# Patient Record
Sex: MALE | Race: WHITE | NOT HISPANIC OR LATINO | Employment: OTHER | URBAN - METROPOLITAN AREA
[De-identification: names, ages, dates, MRNs, and addresses within clinical notes are randomized per-mention and may not be internally consistent; named-entity substitution may affect disease eponyms.]

---

## 2023-02-24 LAB
ALANINE AMINOTRANSFERASE (SGPT) (U/L) IN SER/PLAS: 22 U/L (ref 10–52)
ALBUMIN (G/DL) IN SER/PLAS: 4.3 G/DL (ref 3.4–5)
ALKALINE PHOSPHATASE (U/L) IN SER/PLAS: 84 U/L (ref 33–136)
ANION GAP IN SER/PLAS: 13 MMOL/L (ref 10–20)
APPEARANCE, URINE: CLEAR
ASPARTATE AMINOTRANSFERASE (SGOT) (U/L) IN SER/PLAS: 21 U/L (ref 9–39)
BILIRUBIN TOTAL (MG/DL) IN SER/PLAS: 0.6 MG/DL (ref 0–1.2)
BILIRUBIN, URINE: NEGATIVE
BLOOD, URINE: ABNORMAL
CALCIUM (MG/DL) IN SER/PLAS: 9.4 MG/DL (ref 8.6–10.3)
CARBON DIOXIDE, TOTAL (MMOL/L) IN SER/PLAS: 30 MMOL/L (ref 21–32)
CHLORIDE (MMOL/L) IN SER/PLAS: 101 MMOL/L (ref 98–107)
COLOR, URINE: YELLOW
CREATININE (MG/DL) IN SER/PLAS: 1 MG/DL (ref 0.5–1.3)
ESTIMATED AVERAGE GLUCOSE FOR HBA1C: 143 MG/DL
GFR MALE: 77 ML/MIN/1.73M2
GLUCOSE (MG/DL) IN SER/PLAS: 115 MG/DL (ref 74–99)
GLUCOSE, URINE: NEGATIVE MG/DL
HEMOGLOBIN A1C/HEMOGLOBIN TOTAL IN BLOOD: 6.6 %
KETONES, URINE: NEGATIVE MG/DL
LEUKOCYTE ESTERASE, URINE: NEGATIVE
MUCUS, URINE: NORMAL /LPF
NITRITE, URINE: NEGATIVE
PH, URINE: 5 (ref 5–8)
POTASSIUM (MMOL/L) IN SER/PLAS: 4.7 MMOL/L (ref 3.5–5.3)
PROTEIN TOTAL: 6.5 G/DL (ref 6.4–8.2)
PROTEIN, URINE: NEGATIVE MG/DL
RBC, URINE: <1 /HPF (ref 0–5)
SODIUM (MMOL/L) IN SER/PLAS: 139 MMOL/L (ref 136–145)
SPECIFIC GRAVITY, URINE: 1.02 (ref 1–1.03)
THYROTROPIN (MIU/L) IN SER/PLAS BY DETECTION LIMIT <= 0.05 MIU/L: 3.99 MIU/L (ref 0.44–3.98)
UREA NITROGEN (MG/DL) IN SER/PLAS: 24 MG/DL (ref 6–23)
UROBILINOGEN, URINE: <2 MG/DL (ref 0–1.9)
WBC, URINE: <1 /HPF (ref 0–5)

## 2023-03-17 LAB
BASOPHILS (10*3/UL) IN BLOOD BY AUTOMATED COUNT: 0.05 X10E9/L (ref 0–0.1)
BASOPHILS/100 LEUKOCYTES IN BLOOD BY AUTOMATED COUNT: 0.7 % (ref 0–2)
CALCIDIOL (25 OH VITAMIN D3) (NG/ML) IN SER/PLAS: 32 NG/ML
COBALAMIN (VITAMIN B12) (PG/ML) IN SER/PLAS: 327 PG/ML (ref 211–911)
EOSINOPHILS (10*3/UL) IN BLOOD BY AUTOMATED COUNT: 0.16 X10E9/L (ref 0–0.4)
EOSINOPHILS/100 LEUKOCYTES IN BLOOD BY AUTOMATED COUNT: 2.1 % (ref 0–6)
ERYTHROCYTE DISTRIBUTION WIDTH (RATIO) BY AUTOMATED COUNT: 14 % (ref 11.5–14.5)
ERYTHROCYTE MEAN CORPUSCULAR HEMOGLOBIN CONCENTRATION (G/DL) BY AUTOMATED: 31.4 G/DL (ref 32–36)
ERYTHROCYTE MEAN CORPUSCULAR VOLUME (FL) BY AUTOMATED COUNT: 95 FL (ref 80–100)
ERYTHROCYTES (10*6/UL) IN BLOOD BY AUTOMATED COUNT: 4.3 X10E12/L (ref 4.5–5.9)
HEMATOCRIT (%) IN BLOOD BY AUTOMATED COUNT: 40.7 % (ref 41–52)
HEMOGLOBIN (G/DL) IN BLOOD: 12.8 G/DL (ref 13.5–17.5)
IMMATURE GRANULOCYTES/100 LEUKOCYTES IN BLOOD BY AUTOMATED COUNT: 0.3 % (ref 0–0.9)
IRON (UG/DL) IN SER/PLAS: 78 UG/DL (ref 35–150)
IRON BINDING CAPACITY (UG/DL) IN SER/PLAS: 386 UG/DL (ref 240–445)
IRON SATURATION (%) IN SER/PLAS: 20 % (ref 25–45)
LEUKOCYTES (10*3/UL) IN BLOOD BY AUTOMATED COUNT: 7.6 X10E9/L (ref 4.4–11.3)
LYMPHOCYTES (10*3/UL) IN BLOOD BY AUTOMATED COUNT: 1.6 X10E9/L (ref 0.8–3)
LYMPHOCYTES/100 LEUKOCYTES IN BLOOD BY AUTOMATED COUNT: 21.2 % (ref 13–44)
MONOCYTES (10*3/UL) IN BLOOD BY AUTOMATED COUNT: 0.64 X10E9/L (ref 0.05–0.8)
MONOCYTES/100 LEUKOCYTES IN BLOOD BY AUTOMATED COUNT: 8.5 % (ref 2–10)
NEUTROPHILS (10*3/UL) IN BLOOD BY AUTOMATED COUNT: 5.09 X10E9/L (ref 1.6–5.5)
NEUTROPHILS/100 LEUKOCYTES IN BLOOD BY AUTOMATED COUNT: 67.2 % (ref 40–80)
PLATELETS (10*3/UL) IN BLOOD AUTOMATED COUNT: 323 X10E9/L (ref 150–450)

## 2023-06-21 LAB
BASOPHILS (10*3/UL) IN BLOOD BY AUTOMATED COUNT: 0.06 X10E9/L (ref 0–0.1)
BASOPHILS/100 LEUKOCYTES IN BLOOD BY AUTOMATED COUNT: 0.8 % (ref 0–2)
EOSINOPHILS (10*3/UL) IN BLOOD BY AUTOMATED COUNT: 0.13 X10E9/L (ref 0–0.4)
EOSINOPHILS/100 LEUKOCYTES IN BLOOD BY AUTOMATED COUNT: 1.7 % (ref 0–6)
ERYTHROCYTE DISTRIBUTION WIDTH (RATIO) BY AUTOMATED COUNT: 13.7 % (ref 11.5–14.5)
ERYTHROCYTE MEAN CORPUSCULAR HEMOGLOBIN CONCENTRATION (G/DL) BY AUTOMATED: 31.4 G/DL (ref 32–36)
ERYTHROCYTE MEAN CORPUSCULAR VOLUME (FL) BY AUTOMATED COUNT: 94 FL (ref 80–100)
ERYTHROCYTES (10*6/UL) IN BLOOD BY AUTOMATED COUNT: 3.95 X10E12/L (ref 4.5–5.9)
HEMATOCRIT (%) IN BLOOD BY AUTOMATED COUNT: 37.3 % (ref 41–52)
HEMOGLOBIN (G/DL) IN BLOOD: 11.7 G/DL (ref 13.5–17.5)
IMMATURE GRANULOCYTES/100 LEUKOCYTES IN BLOOD BY AUTOMATED COUNT: 0.4 % (ref 0–0.9)
IRON (UG/DL) IN SER/PLAS: 55 UG/DL (ref 35–150)
IRON BINDING CAPACITY (UG/DL) IN SER/PLAS: 308 UG/DL (ref 240–445)
IRON SATURATION (%) IN SER/PLAS: 18 % (ref 25–45)
LEUKOCYTES (10*3/UL) IN BLOOD BY AUTOMATED COUNT: 7.5 X10E9/L (ref 4.4–11.3)
LYMPHOCYTES (10*3/UL) IN BLOOD BY AUTOMATED COUNT: 1.41 X10E9/L (ref 0.8–3)
LYMPHOCYTES/100 LEUKOCYTES IN BLOOD BY AUTOMATED COUNT: 18.8 % (ref 13–44)
MONOCYTES (10*3/UL) IN BLOOD BY AUTOMATED COUNT: 0.56 X10E9/L (ref 0.05–0.8)
MONOCYTES/100 LEUKOCYTES IN BLOOD BY AUTOMATED COUNT: 7.4 % (ref 2–10)
NEUTROPHILS (10*3/UL) IN BLOOD BY AUTOMATED COUNT: 5.33 X10E9/L (ref 1.6–5.5)
NEUTROPHILS/100 LEUKOCYTES IN BLOOD BY AUTOMATED COUNT: 70.9 % (ref 40–80)
PLATELETS (10*3/UL) IN BLOOD AUTOMATED COUNT: 325 X10E9/L (ref 150–450)

## 2023-07-08 LAB — COBALAMIN (VITAMIN B12) (PG/ML) IN SER/PLAS: 294 PG/ML (ref 211–911)

## 2023-09-05 LAB
ALANINE AMINOTRANSFERASE (SGPT) (U/L) IN SER/PLAS: 19 U/L (ref 10–52)
ALBUMIN (G/DL) IN SER/PLAS: 4.4 G/DL (ref 3.4–5)
ALKALINE PHOSPHATASE (U/L) IN SER/PLAS: 84 U/L (ref 33–136)
ANION GAP IN SER/PLAS: 11 MMOL/L (ref 10–20)
APPEARANCE, URINE: NORMAL
ASPARTATE AMINOTRANSFERASE (SGOT) (U/L) IN SER/PLAS: 21 U/L (ref 9–39)
BASOPHILS (10*3/UL) IN BLOOD BY AUTOMATED COUNT: 0.08 X10E9/L (ref 0–0.1)
BASOPHILS/100 LEUKOCYTES IN BLOOD BY AUTOMATED COUNT: 0.9 % (ref 0–2)
BILIRUBIN TOTAL (MG/DL) IN SER/PLAS: 0.6 MG/DL (ref 0–1.2)
BILIRUBIN, URINE: NEGATIVE
BLOOD, URINE: NEGATIVE
CALCIUM (MG/DL) IN SER/PLAS: 9.6 MG/DL (ref 8.6–10.3)
CARBON DIOXIDE, TOTAL (MMOL/L) IN SER/PLAS: 27 MMOL/L (ref 21–32)
CHLORIDE (MMOL/L) IN SER/PLAS: 104 MMOL/L (ref 98–107)
CHOLESTEROL (MG/DL) IN SER/PLAS: 172 MG/DL (ref 0–199)
CHOLESTEROL IN HDL (MG/DL) IN SER/PLAS: 46.2 MG/DL
CHOLESTEROL/HDL RATIO: 3.7
COLOR, URINE: YELLOW
CREATININE (MG/DL) IN SER/PLAS: 0.91 MG/DL (ref 0.5–1.3)
EOSINOPHILS (10*3/UL) IN BLOOD BY AUTOMATED COUNT: 0.24 X10E9/L (ref 0–0.4)
EOSINOPHILS/100 LEUKOCYTES IN BLOOD BY AUTOMATED COUNT: 2.7 % (ref 0–6)
ERYTHROCYTE DISTRIBUTION WIDTH (RATIO) BY AUTOMATED COUNT: 14.6 % (ref 11.5–14.5)
ERYTHROCYTE MEAN CORPUSCULAR HEMOGLOBIN CONCENTRATION (G/DL) BY AUTOMATED: 31.9 G/DL (ref 32–36)
ERYTHROCYTE MEAN CORPUSCULAR VOLUME (FL) BY AUTOMATED COUNT: 95 FL (ref 80–100)
ERYTHROCYTES (10*6/UL) IN BLOOD BY AUTOMATED COUNT: 4.07 X10E12/L (ref 4.5–5.9)
GFR MALE: 85 ML/MIN/1.73M2
GLUCOSE (MG/DL) IN SER/PLAS: 92 MG/DL (ref 74–99)
GLUCOSE, URINE: NEGATIVE MG/DL
HEMATOCRIT (%) IN BLOOD BY AUTOMATED COUNT: 38.6 % (ref 41–52)
HEMOGLOBIN (G/DL) IN BLOOD: 12.3 G/DL (ref 13.5–17.5)
IMMATURE GRANULOCYTES/100 LEUKOCYTES IN BLOOD BY AUTOMATED COUNT: 0.2 % (ref 0–0.9)
KETONES, URINE: NEGATIVE MG/DL
LDL: 93 MG/DL (ref 0–99)
LEUKOCYTE ESTERASE, URINE: NEGATIVE
LEUKOCYTES (10*3/UL) IN BLOOD BY AUTOMATED COUNT: 8.7 X10E9/L (ref 4.4–11.3)
LYMPHOCYTES (10*3/UL) IN BLOOD BY AUTOMATED COUNT: 2.18 X10E9/L (ref 0.8–3)
LYMPHOCYTES/100 LEUKOCYTES IN BLOOD BY AUTOMATED COUNT: 25 % (ref 13–44)
MONOCYTES (10*3/UL) IN BLOOD BY AUTOMATED COUNT: 0.71 X10E9/L (ref 0.05–0.8)
MONOCYTES/100 LEUKOCYTES IN BLOOD BY AUTOMATED COUNT: 8.1 % (ref 2–10)
MUCUS, URINE: NORMAL /LPF
NEUTROPHILS (10*3/UL) IN BLOOD BY AUTOMATED COUNT: 5.5 X10E9/L (ref 1.6–5.5)
NEUTROPHILS/100 LEUKOCYTES IN BLOOD BY AUTOMATED COUNT: 63.1 % (ref 40–80)
NITRITE, URINE: NEGATIVE
PH, URINE: 5 (ref 5–8)
PLATELETS (10*3/UL) IN BLOOD AUTOMATED COUNT: 289 X10E9/L (ref 150–450)
POTASSIUM (MMOL/L) IN SER/PLAS: 4.4 MMOL/L (ref 3.5–5.3)
PROTEIN TOTAL: 6.6 G/DL (ref 6.4–8.2)
PROTEIN, URINE: NEGATIVE MG/DL
RBC, URINE: 1 /HPF (ref 0–5)
SODIUM (MMOL/L) IN SER/PLAS: 138 MMOL/L (ref 136–145)
SPECIFIC GRAVITY, URINE: 1.02 (ref 1–1.03)
SQUAMOUS EPITHELIAL CELLS, URINE: <1 /HPF
THYROTROPIN (MIU/L) IN SER/PLAS BY DETECTION LIMIT <= 0.05 MIU/L: 3.84 MIU/L (ref 0.44–3.98)
TRIGLYCERIDE (MG/DL) IN SER/PLAS: 166 MG/DL (ref 0–149)
UREA NITROGEN (MG/DL) IN SER/PLAS: 20 MG/DL (ref 6–23)
URIC ACID (URATE) CRYSTALS, URINE: NORMAL /HPF
UROBILINOGEN, URINE: <2 MG/DL (ref 0–1.9)
VLDL: 33 MG/DL (ref 0–40)
WBC, URINE: 2 /HPF (ref 0–5)

## 2023-09-06 LAB
ALBUMIN (MG/L) IN URINE: 9.4 MG/L
ALBUMIN/CREATININE (UG/MG) IN URINE: 7.6 UG/MG CRT (ref 0–30)
CREATININE (MG/DL) IN URINE: 123 MG/DL (ref 20–370)
ESTIMATED AVERAGE GLUCOSE FOR HBA1C: 134 MG/DL
HEMOGLOBIN A1C/HEMOGLOBIN TOTAL IN BLOOD: 6.3 %
PROSTATE SPECIFIC ANTIGEN,SCREEN: 0.71 NG/ML (ref 0–4)

## 2023-09-07 LAB — COBALAMIN (VITAMIN B12) (PG/ML) IN SER/PLAS: 509 PG/ML (ref 211–911)

## 2023-10-10 ENCOUNTER — TELEPHONE (OUTPATIENT)
Dept: PRIMARY CARE | Facility: CLINIC | Age: 78
End: 2023-10-10
Payer: MEDICARE

## 2023-10-10 NOTE — TELEPHONE ENCOUNTER
Pt says 3 weeks ago he left a form from [employer] for MJM to fill out and fax to [employer] to get approval for shoe inserts. He says he just called [employer] and they do not have it. He is wondering if something else is needed for this. Please advise.  Call back number- [phone]   Name band;

## 2023-10-23 DIAGNOSIS — L30.9 DERMATITIS: Primary | ICD-10-CM

## 2023-10-23 RX ORDER — KETOCONAZOLE 20 MG/ML
SHAMPOO, SUSPENSION TOPICAL
COMMUNITY
End: 2024-01-12

## 2023-10-23 RX ORDER — LOSARTAN POTASSIUM 50 MG/1
50 TABLET ORAL DAILY
COMMUNITY

## 2023-10-23 RX ORDER — LORATADINE 10 MG/1
TABLET ORAL
COMMUNITY

## 2023-10-23 RX ORDER — PRAVASTATIN SODIUM 20 MG/1
TABLET ORAL
COMMUNITY
Start: 2022-05-31 | End: 2024-04-15 | Stop reason: SDUPTHER

## 2023-10-23 RX ORDER — METFORMIN HYDROCHLORIDE 1000 MG/1
TABLET ORAL
COMMUNITY

## 2023-10-23 RX ORDER — MEMANTINE HYDROCHLORIDE 10 MG/1
TABLET ORAL
COMMUNITY
End: 2024-03-15 | Stop reason: SDUPTHER

## 2023-10-23 RX ORDER — MIRABEGRON 50 MG/1
50 TABLET, FILM COATED, EXTENDED RELEASE ORAL DAILY
COMMUNITY

## 2023-10-23 RX ORDER — CLOBETASOL PROPIONATE 0.5 MG/G
1 CREAM TOPICAL 2 TIMES DAILY
COMMUNITY
Start: 2023-03-27 | End: 2023-10-23 | Stop reason: SDUPTHER

## 2023-10-23 RX ORDER — MELOXICAM 15 MG/1
TABLET ORAL
COMMUNITY
End: 2023-11-07

## 2023-10-23 RX ORDER — CLOBETASOL PROPIONATE 0.5 MG/G
1 CREAM TOPICAL 2 TIMES DAILY
Qty: 60 G | Refills: 3 | Status: SHIPPED | OUTPATIENT
Start: 2023-10-23

## 2023-10-23 RX ORDER — DOXYCYCLINE HYCLATE 100 MG
TABLET ORAL
COMMUNITY
Start: 2022-10-07 | End: 2024-01-12 | Stop reason: WASHOUT

## 2023-10-23 RX ORDER — MULTIVIT-MINERALS/FOLIC ACID 80 MCG
TABLET,CHEWABLE ORAL
COMMUNITY

## 2023-10-23 RX ORDER — ALBUTEROL SULFATE 90 UG/1
AEROSOL, METERED RESPIRATORY (INHALATION)
COMMUNITY
Start: 2023-06-05 | End: 2023-10-23 | Stop reason: ENTERED-IN-ERROR

## 2023-11-01 ENCOUNTER — TELEPHONE (OUTPATIENT)
Dept: PRIMARY CARE | Facility: CLINIC | Age: 78
End: 2023-11-01
Payer: MEDICARE

## 2023-11-01 NOTE — TELEPHONE ENCOUNTER
Pt called [phone] he would like a referral to a cardiologist, he saw Dr Schultz yesterday he heard something in heart not sure what

## 2023-11-01 NOTE — TELEPHONE ENCOUNTER
Pts pharm faxing over paper for diabetic shoes. [facility] says on the last page where MJM signerd the pt Specifacally on it saying this pt would benefit from diabetic shoes in custom inserts and describe why  Pharm contact- [phone] ext. [phone]

## 2023-11-06 DIAGNOSIS — M54.50 LOW BACK PAIN, UNSPECIFIED BACK PAIN LATERALITY, UNSPECIFIED CHRONICITY, UNSPECIFIED WHETHER SCIATICA PRESENT: ICD-10-CM

## 2023-11-07 PROBLEM — M54.50 LOW BACK PAIN: Status: ACTIVE | Noted: 2020-06-23

## 2023-11-07 RX ORDER — MELOXICAM 15 MG/1
TABLET ORAL
Qty: 45 TABLET | Refills: 1 | Status: SHIPPED | OUTPATIENT
Start: 2023-11-07 | End: 2024-02-15

## 2023-11-10 PROBLEM — I10 BENIGN ESSENTIAL HYPERTENSION: Status: ACTIVE | Noted: 2023-11-10

## 2023-11-10 PROBLEM — E11.9 TYPE 2 DIABETES MELLITUS (MULTI): Status: ACTIVE | Noted: 2018-07-03

## 2023-11-10 PROBLEM — I25.10 MILD CAD: Status: ACTIVE | Noted: 2018-12-12

## 2023-11-10 NOTE — TELEPHONE ENCOUNTER
Spoke with patient at this time, he is planning on seeing Dr. Isbell for his possible cardiac issue which was recommended by Dr. Schultz.

## 2024-01-11 DIAGNOSIS — B35.4 TINEA CORPORIS: ICD-10-CM

## 2024-01-12 ENCOUNTER — OFFICE VISIT (OUTPATIENT)
Dept: OTOLARYNGOLOGY | Facility: CLINIC | Age: 79
End: 2024-01-12
Payer: MEDICARE

## 2024-01-12 DIAGNOSIS — R09.82 POSTNASAL DISCHARGE: ICD-10-CM

## 2024-01-12 DIAGNOSIS — K21.9 GASTROESOPHAGEAL REFLUX DISEASE, UNSPECIFIED WHETHER ESOPHAGITIS PRESENT: Primary | ICD-10-CM

## 2024-01-12 DIAGNOSIS — J00 NASOPHARYNGITIS: ICD-10-CM

## 2024-01-12 DIAGNOSIS — H93.19 TINNITUS, UNSPECIFIED LATERALITY: ICD-10-CM

## 2024-01-12 PROBLEM — L81.9 CHANGING PIGMENTED SKIN LESION: Status: ACTIVE | Noted: 2022-09-08

## 2024-01-12 PROBLEM — E55.9 VITAMIN D DEFICIENCY: Status: ACTIVE | Noted: 2018-12-12

## 2024-01-12 PROBLEM — M19.071 OSTEOARTHRITIS OF FEET, BILATERAL: Status: ACTIVE | Noted: 2021-01-07

## 2024-01-12 PROBLEM — R14.0 ABDOMINAL BLOATING: Status: RESOLVED | Noted: 2023-09-13 | Resolved: 2024-01-12

## 2024-01-12 PROBLEM — C44.719 BASAL CELL CARCINOMA OF SKIN OF LEFT LOWER LIMB, INCLUDING HIP: Status: ACTIVE | Noted: 2023-03-27

## 2024-01-12 PROBLEM — L01.03 BULLOUS IMPETIGO: Status: ACTIVE | Noted: 2023-03-27

## 2024-01-12 PROBLEM — R07.81 RIB PAIN ON LEFT SIDE: Status: RESOLVED | Noted: 2023-06-05 | Resolved: 2024-01-12

## 2024-01-12 PROBLEM — M21.42 ACQUIRED LEFT FLAT FOOT: Status: ACTIVE | Noted: 2021-01-11

## 2024-01-12 PROBLEM — M21.40 PES PLANUS: Status: ACTIVE | Noted: 2021-01-07

## 2024-01-12 PROBLEM — D64.9 ANEMIA: Status: ACTIVE | Noted: 2018-07-06

## 2024-01-12 PROBLEM — D48.5 NEOPLASM OF UNCERTAIN BEHAVIOR OF SKIN: Status: ACTIVE | Noted: 2023-03-27

## 2024-01-12 PROBLEM — R53.83 FATIGUE: Status: RESOLVED | Noted: 2023-03-08 | Resolved: 2024-01-12

## 2024-01-12 PROBLEM — S30.861A TICK BITE OF ABDOMINAL WALL: Status: RESOLVED | Noted: 2020-04-30 | Resolved: 2024-01-12

## 2024-01-12 PROBLEM — J39.2 NASOPHARYNGEAL MASS: Status: ACTIVE | Noted: 2024-01-12

## 2024-01-12 PROBLEM — G47.33 OBSTRUCTIVE SLEEP APNEA, ADULT: Status: ACTIVE | Noted: 2024-01-12

## 2024-01-12 PROBLEM — R21 RASH: Status: RESOLVED | Noted: 2021-07-01 | Resolved: 2024-01-12

## 2024-01-12 PROBLEM — L81.4 OTHER MELANIN HYPERPIGMENTATION: Status: ACTIVE | Noted: 2023-03-27

## 2024-01-12 PROBLEM — M48.00 SPINAL STENOSIS: Status: ACTIVE | Noted: 2018-08-22

## 2024-01-12 PROBLEM — B35.4 TINEA CORPORIS: Status: ACTIVE | Noted: 2023-03-27

## 2024-01-12 PROBLEM — R10.30 LOWER ABDOMINAL PAIN: Status: RESOLVED | Noted: 2018-11-08 | Resolved: 2024-01-12

## 2024-01-12 PROBLEM — L57.0 ACTINIC KERATOSIS: Status: ACTIVE | Noted: 2019-05-02

## 2024-01-12 PROBLEM — L03.114 LEFT ARM CELLULITIS: Status: RESOLVED | Noted: 2019-02-18 | Resolved: 2024-01-12

## 2024-01-12 PROBLEM — I10 HYPERTENSION: Status: ACTIVE | Noted: 2018-07-06

## 2024-01-12 PROBLEM — M25.562 LEFT KNEE PAIN: Status: RESOLVED | Noted: 2020-02-13 | Resolved: 2024-01-12

## 2024-01-12 PROBLEM — R06.02 SHORT OF BREATH ON EXERTION: Status: ACTIVE | Noted: 2019-09-11

## 2024-01-12 PROBLEM — R41.3 MEMORY LOSS: Status: ACTIVE | Noted: 2022-09-08

## 2024-01-12 PROBLEM — D18.01 HEMANGIOMA OF SKIN AND SUBCUTANEOUS TISSUE: Status: RESOLVED | Noted: 2023-03-27 | Resolved: 2024-01-12

## 2024-01-12 PROBLEM — G43.909 MIGRAINE HEADACHE: Status: RESOLVED | Noted: 2024-01-12 | Resolved: 2024-01-12

## 2024-01-12 PROBLEM — L82.1 OTHER SEBORRHEIC KERATOSIS: Status: ACTIVE | Noted: 2023-03-27

## 2024-01-12 PROBLEM — N40.1 ENLARGED PROSTATE WITH LOWER URINARY TRACT SYMPTOMS (LUTS): Status: ACTIVE | Noted: 2020-12-04

## 2024-01-12 PROBLEM — R05.9 COUGH: Status: RESOLVED | Noted: 2023-06-05 | Resolved: 2024-01-12

## 2024-01-12 PROBLEM — R32 URINARY INCONTINENCE: Status: RESOLVED | Noted: 2022-09-08 | Resolved: 2024-01-12
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
## 2024-01-12 PROBLEM — M19.072 OSTEOARTHRITIS OF FEET, BILATERAL: Status: ACTIVE | Noted: 2021-01-07
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
## 2024-01-12 PROBLEM — S39.012A STRAIN OF LUMBAR PARASPINAL MUSCLE: Status: ACTIVE | Noted: 2020-06-23

## 2024-01-12 PROBLEM — M15.0 PRIMARY GENERALIZED (OSTEO)ARTHRITIS: Status: ACTIVE | Noted: 2019-05-02

## 2024-01-12 PROBLEM — R79.89 ABNORMAL TSH: Status: RESOLVED | Noted: 2018-07-06 | Resolved: 2024-01-12
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
## 2024-01-12 PROBLEM — G47.9 SLEEP DISTURBANCE: Status: ACTIVE | Noted: 2024-01-12

## 2024-01-12 PROBLEM — L30.8 OTHER SPECIFIED DERMATITIS: Status: ACTIVE | Noted: 2023-03-27

## 2024-01-12 PROBLEM — J31.0 CHRONIC RHINITIS: Status: ACTIVE | Noted: 2021-08-19

## 2024-01-12 PROBLEM — L90.5 SCAR CONDITION AND FIBROSIS OF SKIN: Status: ACTIVE | Noted: 2023-03-27

## 2024-01-12 PROBLEM — N40.0 BENIGN PROSTATIC HYPERPLASIA: Status: ACTIVE | Noted: 2022-09-08

## 2024-01-12 PROBLEM — Z85.828 PERSONAL HISTORY OF OTHER MALIGNANT NEOPLASM OF SKIN: Status: ACTIVE | Noted: 2023-03-27

## 2024-01-12 PROBLEM — J20.9 ACUTE BRONCHITIS: Status: RESOLVED | Noted: 2023-06-05 | Resolved: 2024-01-12

## 2024-01-12 PROCEDURE — 1126F AMNT PAIN NOTED NONE PRSNT: CPT | Performed by: OTOLARYNGOLOGY

## 2024-01-12 PROCEDURE — 99204 OFFICE O/P NEW MOD 45 MIN: CPT | Performed by: OTOLARYNGOLOGY

## 2024-01-12 PROCEDURE — 31231 NASAL ENDOSCOPY DX: CPT | Performed by: OTOLARYNGOLOGY

## 2024-01-12 PROCEDURE — 1159F MED LIST DOCD IN RCRD: CPT | Performed by: OTOLARYNGOLOGY

## 2024-01-12 RX ORDER — TAMSULOSIN HYDROCHLORIDE 0.4 MG/1
0.4 CAPSULE ORAL DAILY
COMMUNITY
Start: 2023-11-06 | End: 2024-01-29

## 2024-01-12 RX ORDER — KETOCONAZOLE 20 MG/ML
SHAMPOO, SUSPENSION TOPICAL
Qty: 120 ML | Refills: 2 | Status: SHIPPED | OUTPATIENT
Start: 2024-01-12 | End: 2024-03-26

## 2024-01-12 RX ORDER — DOXYCYCLINE HYCLATE 100 MG
100 TABLET ORAL 2 TIMES DAILY
Qty: 20 TABLET | Refills: 0 | Status: SHIPPED | OUTPATIENT
Start: 2024-01-12 | End: 2024-01-22

## 2024-01-12 RX ORDER — PANTOPRAZOLE SODIUM 40 MG/1
TABLET, DELAYED RELEASE ORAL
Qty: 90 TABLET | Refills: 0 | Status: SHIPPED | OUTPATIENT
Start: 2024-01-12 | End: 2024-04-05 | Stop reason: SDUPTHER

## 2024-01-12 ASSESSMENT — ENCOUNTER SYMPTOMS
LOSS OF SENSATION IN FEET: 1
DEPRESSION: 0
OCCASIONAL FEELINGS OF UNSTEADINESS: 0

## 2024-01-12 NOTE — PROGRESS NOTES
"History Of Present Illness  Daryl Ferrara is a 78 y.o. male presenting with: \"Postnasal drip\".  He is kindly referred by Dr. Bunny Floyd.    He also has tinnitus for 50-60 years.  He has postnasal discharge for years. He constantly clears his throat.  He never brought it up.    He thinks he has GERD.  Allergic rhinitis: not really. Perhaps he is allergic to dust.    He is informed about lenire.  On examination, there was some purulent yellowish dried secretion at nasopharynx.  It was suctioned out.  Topical antiseptic agent was applied.  Minimal arytenoid edema.  Vocal cords are mobile bilaterally.  I have not noticed any purulent material running down to her larynx.    Plan  1-continue PPI once a day  2-oral doxycycline  3-follow-up in 3 months       Past Medical History  He has a past medical history of Abdominal bloating (09/13/2023), Acute bronchitis (06/05/2023), Cough (06/05/2023), Fall at home (06/05/2023), Hemangioma of skin and subcutaneous tissue (03/27/2023), Left knee pain (02/13/2020), Lower abdominal pain (11/08/2018), Lower urinary tract symptoms (08/01/2004), Other symbolic dysfunctions, Periodic limb movement disorder, Personal history of other diseases of the musculoskeletal system and connective tissue, Personal history of other diseases of the nervous system and sense organs, Personal history of other specified conditions, Personal history of other specified conditions, Rash (07/01/2021), Tick bite of abdominal wall (04/30/2020), and Urinary incontinence (09/08/2022).    Surgical History  He has a past surgical history that includes Other surgical history (06/15/2021) and Other surgical history (06/15/2021).     Social History  He has no history on file for tobacco use, alcohol use, and drug use.    Family History  No family history on file.     Allergies  Penicillins, Atorvastatin, Lisinopril, Losartan, and Pravastatin sodium    Review of Systems   Tinnitus  Nasal discharge     " "  Physical Exam    General appearance: Healthy-appearing, well-nourished, well groomed, in no acute distress.     Head and Face: Atraumatic with no masses, lesions, or scarring.      Salivary glands: No tenderness of the parotid glands or parotid masses.     No tenderness of the submandibular glands or submandibular masses.      Facial strength: Normal strength and symmetry, no synkinesis or facial tic.     Eyes: Conjunctivas look non-hyperemic bilaterally    Ears: Bilaterally ear canals look normal. Tympanic membranes look intact, no hyperemia, fluid or retraction. Hearing grossly normal.      Nose: Mucosa looks normal. No purulent discharge. Septum essentially straight.     Oral Cavity/Mouth: Lips and tongue look normal.     Throat: No postnasal discharge. No tonsil hypertrophy. No hyperemia.    Neck: Symmetrical, trachea midline.     Pulmonary: Normal respiratory effort.     Lymphatic: No palpable pathologic lymph nodes at neck.     Neurological/Psychiatric Orientation to person, place, and time: Normal.     Mood and affect: Normal.      Extremities: No clubbing.     Skin: No significant skin lesions were noted at face or neck        Procedure  FLEXIBLE ENDOSCOPY 01.12.2024  After application of topical lidocaine and decongestant, flexible endoscope was advanced through patient's nasal cavities. On examination, there was some purulent yellowish dried secretion at nasopharynx.  It was suctioned out.  Topical antiseptic agent was applied.  Minimal arytenoid edema.  Vocal cords are mobile bilaterally.  I have not noticed any purulent material running down to her larynx.         Last Recorded Vitals  There were no vitals taken for this visit.    Assessment and Plan:  Daryl Ferrara is a 78 y.o. male presenting with: \"Postnasal drip\".  He is kindly referred by Dr. Bunny Floyd.    He also has tinnitus for 50-60 years.  He has postnasal discharge for years. He constantly clears his throat.  He never brought " it up.    He thinks he has GERD.  Allergic rhinitis: not really. Perhaps he is allergic to dust.    He is informed about lenire.  On examination, there was some purulent yellowish dried secretion at nasopharynx.  It was suctioned out.  Topical antiseptic agent was applied.  Minimal arytenoid edema.  Vocal cords are mobile bilaterally.  I have not noticed any purulent material running down to her larynx.    Plan  1-continue PPI once a day  2-oral doxycycline  3-follow-up in 3 months  4- consider lenire for tinnitus      Tegan Chiu  Otolaryngology - Head & Neck Surgery

## 2024-01-15 ENCOUNTER — APPOINTMENT (OUTPATIENT)
Dept: DERMATOLOGY | Facility: CLINIC | Age: 79
End: 2024-01-15
Payer: MEDICARE

## 2024-01-17 DIAGNOSIS — J34.89 NASAL VESTIBULITIS: Primary | ICD-10-CM

## 2024-01-17 RX ORDER — MUPIROCIN 20 MG/G
OINTMENT TOPICAL 2 TIMES DAILY
Qty: 22 G | Refills: 3 | Status: SHIPPED | OUTPATIENT
Start: 2024-01-17 | End: 2024-01-27

## 2024-01-18 ENCOUNTER — OFFICE VISIT (OUTPATIENT)
Dept: SLEEP MEDICINE | Facility: CLINIC | Age: 79
End: 2024-01-18
Payer: MEDICARE

## 2024-01-18 VITALS
OXYGEN SATURATION: 98 % | HEIGHT: 67 IN | BODY MASS INDEX: 27 KG/M2 | HEART RATE: 78 BPM | WEIGHT: 172 LBS | DIASTOLIC BLOOD PRESSURE: 58 MMHG | SYSTOLIC BLOOD PRESSURE: 114 MMHG

## 2024-01-18 DIAGNOSIS — G47.33 OBSTRUCTIVE SLEEP APNEA, ADULT: Primary | ICD-10-CM

## 2024-01-18 DIAGNOSIS — G47.61 PERIODIC LIMB MOVEMENT DISORDER: ICD-10-CM

## 2024-01-18 DIAGNOSIS — Z45.42 ENCOUNTER FOR ADJUSTMENT AND MANAGEMENT OF NEUROSTIMULATOR: ICD-10-CM

## 2024-01-18 DIAGNOSIS — D50.9 IRON DEFICIENCY ANEMIA, UNSPECIFIED IRON DEFICIENCY ANEMIA TYPE: ICD-10-CM

## 2024-01-18 PROBLEM — E83.10 DISORDER OF IRON METABOLISM: Status: ACTIVE | Noted: 2024-01-18

## 2024-01-18 PROCEDURE — 3074F SYST BP LT 130 MM HG: CPT | Performed by: INTERNAL MEDICINE

## 2024-01-18 PROCEDURE — 3078F DIAST BP <80 MM HG: CPT | Performed by: INTERNAL MEDICINE

## 2024-01-18 PROCEDURE — 1159F MED LIST DOCD IN RCRD: CPT | Performed by: INTERNAL MEDICINE

## 2024-01-18 PROCEDURE — 1126F AMNT PAIN NOTED NONE PRSNT: CPT | Performed by: INTERNAL MEDICINE

## 2024-01-18 PROCEDURE — 99215 OFFICE O/P EST HI 40 MIN: CPT | Performed by: INTERNAL MEDICINE

## 2024-01-18 PROCEDURE — 1036F TOBACCO NON-USER: CPT | Performed by: INTERNAL MEDICINE

## 2024-01-18 PROCEDURE — 95970 ALYS NPGT W/O PRGRMG: CPT | Performed by: INTERNAL MEDICINE

## 2024-01-18 RX ORDER — SILDENAFIL 100 MG/1
100 TABLET, FILM COATED ORAL DAILY PRN
COMMUNITY

## 2024-01-18 RX ORDER — PIOGLITAZONEHYDROCHLORIDE 30 MG/1
30 TABLET ORAL DAILY
COMMUNITY
End: 2024-01-29

## 2024-01-18 RX ORDER — DONEPEZIL HYDROCHLORIDE 5 MG/1
5 TABLET, ORALLY DISINTEGRATING ORAL NIGHTLY
COMMUNITY

## 2024-01-18 RX ORDER — FINASTERIDE 5 MG/1
5 TABLET, FILM COATED ORAL DAILY
COMMUNITY
End: 2024-03-26

## 2024-01-18 RX ORDER — DEXTROMETHORPHAN HYDROBROMIDE, GUAIFENESIN 5; 100 MG/5ML; MG/5ML
650 LIQUID ORAL EVERY 8 HOURS PRN
COMMUNITY

## 2024-01-18 RX ORDER — ASPIRIN 81 MG/1
81 TABLET ORAL DAILY
COMMUNITY

## 2024-01-18 RX ORDER — LANOLIN ALCOHOL/MO/W.PET/CERES
1000 CREAM (GRAM) TOPICAL DAILY
COMMUNITY

## 2024-01-18 ASSESSMENT — SLEEP AND FATIGUE QUESTIONNAIRES
HOW LIKELY ARE YOU TO NOD OFF OR FALL ASLEEP WHEN YOU ARE A PASSENGER IN A CAR FOR AN HOUR WITHOUT A BREAK: WOULD NEVER DOZE
HOW LIKELY ARE YOU TO NOD OFF OR FALL ASLEEP WHILE SITTING AND READING: MODERATE CHANCE OF DOZING
SITING INACTIVE IN A PUBLIC PLACE LIKE A CLASS ROOM OR A MOVIE THEATER: WOULD NEVER DOZE
HOW LIKELY ARE YOU TO NOD OFF OR FALL ASLEEP IN A CAR, WHILE STOPPED FOR A FEW MINUTES IN TRAFFIC: WOULD NEVER DOZE
HOW LIKELY ARE YOU TO NOD OFF OR FALL ASLEEP WHILE LYING DOWN TO REST IN THE AFTERNOON WHEN CIRCUMSTANCES PERMIT: MODERATE CHANCE OF DOZING
HOW LIKELY ARE YOU TO NOD OFF OR FALL ASLEEP WHILE SITTING QUIETLY AFTER LUNCH WITHOUT ALCOHOL: WOULD NEVER DOZE
HOW LIKELY ARE YOU TO NOD OFF OR FALL ASLEEP WHILE SITTING AND TALKING TO SOMEONE: WOULD NEVER DOZE
ESS-CHAD TOTAL SCORE: 4
HOW LIKELY ARE YOU TO NOD OFF OR FALL ASLEEP WHILE WATCHING TV: WOULD NEVER DOZE

## 2024-01-18 ASSESSMENT — PAIN SCALES - GENERAL: PAINLEVEL: 0-NO PAIN

## 2024-01-18 NOTE — PROGRESS NOTES
Subjective   Patient ID: Daryl Ferrara is a 78 y.o. male who presents for Inspire - Followup and Sleep Apnea.  HPI    Prior sleep history:  04/20/2023: DAYRL presents for routine visit to monitor response to Inspire therapy. The patient continues to report significant benefit from the device. DARYL denies any significant side effects or intolerance of therapy.  He reports that he is getting a tingling sensation in his neck and right shoulder any time of the day. He naps in a recliner occasionally. He is currently using 1.6v and does not believe that he can increase therapy. He believes that he may be more rested in the morning, but he believes that his medications are causing him to feel tired.     The waveform was checked with adequate rise/fall. Tongue motion was assessed and deemed appropriate.     Current settings:  Implant date 10/20/2021  Sleep study prior:  Fine tune study 3/11/22, therapeutic amplitude 1.7 v  Incoming amplitude (range) :1.6 v ( 1.6- 1.9v)  Usage 40 hours per week     Outgoing Inspire settings:  Incoming Amplitude 1.6 v.   Outgoing amplitude 1.6 v   Range of ( 1.6 -1.9 v)   Therapeutic amplitude 1.7 v     Start delay 30 minutes (not adjusted)  Pause/delay 20 minutes (not adjusted today)  Duration of therapy 8 hours (not adjusted today)     He had a therapeutic amplitude of 1.7 v during his prior fine tune titration,    07/20/2023 : DARYL presents for routine visit to monitor response to Inspire therapy. DARYL continues to report significant benefit from the device. DARYL denies any significant side effects or intolerance of therapy.        Implantation date: 10/20/2021   Implanting surgeon: Dr. Vic Bassett   Activation date: 11/30/2021  Functional threshold at activation 1.3 v (1.1 v - 2.1 v)     Prior sleep testing: Inspire Fine tune study 3/11/22, therapeutic amplitude 1.7 v  Inspire Fine tune 06/22/2023, Therapeutic amplitude 1.6 volts & 1.8 volts (1.7 volts was not  observed)     ERICKA FERRARA 's Inspire cloud data was reviewed for compliance and interpreted by myself:      % total nights used over the last 30 days: 78%  Nights used > 4 hours: 78%  Hours used per night:5.1  Pauses per night: 1.4  Amplitude where the patient utilized therapy the most 1.7 volts     Tongue motion was assessed without evidence of neurapraxia.     The Inspire device was interrogated at today's visit and adjustments were made.  Functional threshold on 07/20/2023 1.2 volts     The plethysmography waveform was interrogated and an adequate rise and fall was observed..      Current settings:  Incoming amplitude (amplitude range) : 1.7 v ( 1.4 - 2.4v)     Outgoing Inspire settings:  Outgoing amplitude (amplitude range) 1.7 v( 1.5 -1.7v)   Therapeutic amplitude 1.7 v.      Start delay 30 minutes   Pause/delay 20 minutes (not adjusted today)  Duration of therapy 8 hours (not adjusted today)     Advanced settings N/A     PERIODIC LIMB MOVEMENTS with a PMI 58 on sleep testing. He does have low iron stores, denies RLS symptoms. He was prescribed oral iron but stopped due to constipation     BALANCE ISSUES he is scheduled for an MRI through neurology  Current sleep history:  He went to see neurology, most of his issues are due to neuropathy and one foot is flat footed. He is undergoing PT.    He was on iron supplement previously, but stopped taking it. Not aware of his nocturnal leg movements.    Inspire appointment after activation to evaluate Ericka Ferrara 's   Chief Complaint   Patient presents with    Inspire - Followup    Sleep Apnea    and response to therapy.     Ericka denies  issues with his Inspire therapy.     Inspire remote interrogation:   The Inspire cloud was available for review.    Inspire Remote interrogation via USB or bluetooth:       INCOMING SETTINGS:   BIPOLAR CONFIGURATION (DEFAULT) : (+, -, +)    Incoming Amplitude 1.7 volts.   Incoming Range ( 1.5 - 1.7 v)  Rate 33 Hz  Pulse  Width 90 us    Start delay 30   minutes   Pause/delay 20  minutes    Duration of therapy 8 hours       DEVICE INTERROGATION:    Electrode configurations tested:    BIPOLAR CONFIGURATION (DEFAULT) : (+, -, +)    NO CHANGES MADE AT TODAYS VISIT:   Outgoing settings:     BIPOLAR CONFIGURATION (DEFAULT) : (+, -, +)      Outgoing amplitude 1.7 volts   Range adjusted to ( 1.5 - 1.7 volts)   Therapeutic amplitude 1.7 volts  Rate 33 Hz  Pulse Width 90 us    Start delay 30   minutes   Pause/delay 20  minutes    Duration of therapy 8 hours     The waveform was evaluated. An adequate rise and fall was observed.    Review of Systems  Review of systems negative except as per HPI     Objective   Physical Exam  PHYSICAL EXAM: GENERAL: alert pleasant and cooperative no acute distress  PSYCH EXAM: alert,oriented, in NAD with a full range of affect, normal behavior and no psychotic features   Tongue motion was assessed:  tongue protrusion was midline, no fasciculations noted  Tongue protrusion with stimulation was  tongued protruded to the left without fasciculations    Assessment/Plan   Problem List Items Addressed This Visit             ICD-10-CM    Anemia D64.9     Recheck iron levels and supplement if needed with ferritin <75 due to high PLM-I         Relevant Orders    Ferritin    Iron and TIBC    Obstructive sleep apnea, adult - Primary G47.33     He is responding well and has good compliance with Inspire therapy and is tolerating therapeutic amplitude         Encounter for adjustment and management of neurostimulator Z45.42     Outgoing settings:     BIPOLAR CONFIGURATION (DEFAULT) : (+, -, +)      Outgoing amplitude 1.7 volts   Range adjusted to ( 1.5 - 1.7 volts)   Therapeutic amplitude 1.7 volts  Rate 33 Hz  Pulse Width 90 us    Start delay 30   minutes   Pause/delay 20  minutes    Duration of therapy 8 hours     The waveform was evaluated. An adequate rise and fall was observed.         Relevant Orders    Ferritin    Iron  and TIBC    Periodic limb movement disorder G47.61     Check iron levels

## 2024-01-18 NOTE — ASSESSMENT & PLAN NOTE
Outgoing settings:     BIPOLAR CONFIGURATION (DEFAULT) : (+, -, +)      Outgoing amplitude 1.7 volts   Range adjusted to ( 1.5 - 1.7 volts)   Therapeutic amplitude 1.7 volts  Rate 33 Hz  Pulse Width 90 us    Start delay 30   minutes   Pause/delay 20  minutes    Duration of therapy 8 hours     The waveform was evaluated. An adequate rise and fall was observed.

## 2024-01-18 NOTE — ASSESSMENT & PLAN NOTE
He is responding well and has good compliance with Inspire therapy and is tolerating therapeutic amplitude

## 2024-01-22 ENCOUNTER — PROCEDURE VISIT (OUTPATIENT)
Dept: DERMATOLOGY | Facility: CLINIC | Age: 79
End: 2024-01-22

## 2024-01-22 DIAGNOSIS — Z41.1 ENCOUNTER FOR COSMETIC SURGERY: Primary | ICD-10-CM

## 2024-01-22 PROCEDURE — VBEAM DESTRUCTION OF SKIN LESION V BEAM PER UNIT: Performed by: DERMATOLOGY

## 2024-01-22 NOTE — PROGRESS NOTES
Subjective     Daryl Ferrara is a 78 y.o. male who presents for the following: Laser Treatment (LV: 3/27/23: Port wine stain.  Pt notes improvement and would like to treat left cheek and left upper lip today.).     Review of Systems:  No other skin or systemic complaints other than what is documented elsewhere in the note.    The following portions of the chart were reviewed this encounter and updated as appropriate:          Skin Cancer History  No skin cancer on file.      Specialty Problems          Dermatology Problems    Actinic keratosis    Changing pigmented skin lesion    Basal cell carcinoma of skin of left lower limb, including hip    Neoplasm of uncertain behavior of skin    Other melanin hyperpigmentation    Other seborrheic keratosis    Other specified dermatitis    Personal history of other malignant neoplasm of skin    Scar condition and fibrosis of skin    Tinea corporis        Objective   Well appearing patient in no apparent distress; mood and affect are within normal limits.    A focused skin examination was performed. All findings within normal limits unless otherwise noted below.    Assessment/Plan       The patient is here for a subsequent V-Beam treatment. Treatment #  28    Consent and waiver reviewed and signed by patient for today's procedure. The cosmetic-dermatological indications and contraindications for treatment were discussed, and realistic and expected outcomes of this procedure were identified with the patient. Risks and benefits were discussed and questions were answered including need for multiple treatments, bruising that can last up to 2 weeks,  swelling, blister/crusting and scarring.    V-Beam  Diagnosis: PWS    Treatment #  28  Location Left cheek and left upper lip  Spot Size 7 mm  Energy 12.25 J/cm2  Pulse Width 0.45 msec   DCD [place] 30  DCD Delay 20  # Pulses 55    An appropriate tissue response was visualized. Patient tolerated procedure well. Mild bruising was  noted. After the  procedure the following was applied: ice.     Follow up for FBSE in the spring.

## 2024-01-29 DIAGNOSIS — N40.0 BENIGN PROSTATIC HYPERPLASIA WITHOUT LOWER URINARY TRACT SYMPTOMS: Primary | ICD-10-CM

## 2024-01-29 DIAGNOSIS — E78.5 TYPE 2 DIABETES MELLITUS WITH HYPERLIPIDEMIA (MULTI): ICD-10-CM

## 2024-01-29 DIAGNOSIS — E11.69 TYPE 2 DIABETES MELLITUS WITH HYPERLIPIDEMIA (MULTI): ICD-10-CM

## 2024-01-29 RX ORDER — TAMSULOSIN HYDROCHLORIDE 0.4 MG/1
0.4 CAPSULE ORAL DAILY
Qty: 90 CAPSULE | Refills: 3 | Status: SHIPPED | OUTPATIENT
Start: 2024-01-29 | End: 2024-02-26 | Stop reason: SDUPTHER

## 2024-01-29 RX ORDER — PIOGLITAZONEHYDROCHLORIDE 30 MG/1
30 TABLET ORAL DAILY
Qty: 90 TABLET | Refills: 3 | Status: SHIPPED | OUTPATIENT
Start: 2024-01-29

## 2024-02-05 ENCOUNTER — LAB (OUTPATIENT)
Dept: LAB | Facility: LAB | Age: 79
End: 2024-02-05
Payer: MEDICARE

## 2024-02-05 DIAGNOSIS — Z45.42 ENCOUNTER FOR ADJUSTMENT AND MANAGEMENT OF NEUROSTIMULATOR: ICD-10-CM

## 2024-02-05 DIAGNOSIS — D50.9 IRON DEFICIENCY ANEMIA, UNSPECIFIED IRON DEFICIENCY ANEMIA TYPE: ICD-10-CM

## 2024-02-05 LAB
FERRITIN SERPL-MCNC: 70 NG/ML (ref 20–300)
IRON SATN MFR SERPL: 15 % (ref 25–45)
IRON SERPL-MCNC: 56 UG/DL (ref 35–150)
TIBC SERPL-MCNC: 370 UG/DL (ref 240–445)
UIBC SERPL-MCNC: 314 UG/DL (ref 110–370)

## 2024-02-05 PROCEDURE — 83540 ASSAY OF IRON: CPT

## 2024-02-05 PROCEDURE — 36415 COLL VENOUS BLD VENIPUNCTURE: CPT

## 2024-02-05 PROCEDURE — 82728 ASSAY OF FERRITIN: CPT

## 2024-02-05 PROCEDURE — 83550 IRON BINDING TEST: CPT

## 2024-02-07 ENCOUNTER — TELEPHONE (OUTPATIENT)
Dept: SLEEP MEDICINE | Facility: HOSPITAL | Age: 79
End: 2024-02-07
Payer: MEDICARE

## 2024-02-07 DIAGNOSIS — D50.9 IRON DEFICIENCY ANEMIA, UNSPECIFIED IRON DEFICIENCY ANEMIA TYPE: ICD-10-CM

## 2024-02-07 RX ORDER — FERROUS SULFATE 325(65) MG
325 TABLET ORAL DAILY
Qty: 90 TABLET | Refills: 1 | Status: SHIPPED | OUTPATIENT
Start: 2024-02-07 | End: 2024-03-27

## 2024-02-07 NOTE — TELEPHONE ENCOUNTER
Called and updated patient on Iron lab results, patient made aware that Iron levels are low at 56 micrograms/dL and Iron saturation is 15%. Ferritin is 70 ng/ml. Let patient know that Dr. Pearson would like him to start oral Iron therapy. Patient agreeable to start PO Ferrous Sulfate Therapy.  Discussed that Iron is best taken with 8 ounces of water or fruit juice, about 1 hour before or 2 hours after meals. Avoid having dairy products (milk), tea, coffee or antacids within 2 hours before or after this medication because they decrease its effectiveness.  Discussed side effects to watch for and notify physician of. Prescription sent to [facility] on file. Patient scheduled for a follow-up appointment with Dr. Pearson on 7/18/2024 Patient verbalized understanding, all questions answered.

## 2024-02-07 NOTE — TELEPHONE ENCOUNTER
----- Message from Ko Pearson MD sent at 2/5/2024  4:39 PM EST -----  Iron levels low. Needs iron supplementation

## 2024-02-12 ENCOUNTER — TELEPHONE (OUTPATIENT)
Dept: PRIMARY CARE | Facility: CLINIC | Age: 79
End: 2024-02-12
Payer: MEDICARE

## 2024-02-12 DIAGNOSIS — E11.69 TYPE 2 DIABETES MELLITUS WITH HYPERLIPIDEMIA (MULTI): ICD-10-CM

## 2024-02-12 DIAGNOSIS — E78.5 TYPE 2 DIABETES MELLITUS WITH HYPERLIPIDEMIA (MULTI): ICD-10-CM

## 2024-02-12 RX ORDER — PIOGLITAZONEHYDROCHLORIDE 30 MG/1
30 TABLET ORAL DAILY
Qty: 90 TABLET | Refills: 3 | Status: CANCELLED | OUTPATIENT
Start: 2024-02-12

## 2024-02-12 NOTE — TELEPHONE ENCOUNTER
Rx Line    Name:  Daryl Ferrara  :  [id number]  Medication Name:  pioglitazone   30 mg   90  Specific Pharmacy location:  [facility] on [address]    Best number to reach patient:  Mobile

## 2024-02-15 DIAGNOSIS — M54.50 LOW BACK PAIN, UNSPECIFIED BACK PAIN LATERALITY, UNSPECIFIED CHRONICITY, UNSPECIFIED WHETHER SCIATICA PRESENT: ICD-10-CM

## 2024-02-15 RX ORDER — MELOXICAM 15 MG/1
TABLET ORAL
Qty: 45 TABLET | Refills: 1 | Status: SHIPPED | OUTPATIENT
Start: 2024-02-15

## 2024-02-26 ENCOUNTER — TELEPHONE (OUTPATIENT)
Dept: PRIMARY CARE | Facility: CLINIC | Age: 79
End: 2024-02-26
Payer: MEDICARE

## 2024-02-26 DIAGNOSIS — N40.0 BENIGN PROSTATIC HYPERPLASIA WITHOUT LOWER URINARY TRACT SYMPTOMS: ICD-10-CM

## 2024-02-26 RX ORDER — TAMSULOSIN HYDROCHLORIDE 0.4 MG/1
0.4 CAPSULE ORAL DAILY
Qty: 90 CAPSULE | Refills: 3 | Status: SHIPPED | OUTPATIENT
Start: 2024-02-26

## 2024-03-14 DIAGNOSIS — R41.3 MEMORY LOSS: ICD-10-CM

## 2024-03-15 RX ORDER — MEMANTINE HYDROCHLORIDE 10 MG/1
TABLET ORAL
Qty: 180 TABLET | Refills: 1 | Status: SHIPPED | OUTPATIENT
Start: 2024-03-15

## 2024-03-22 DIAGNOSIS — E11.9 TYPE 2 DIABETES MELLITUS WITHOUT COMPLICATIONS (MULTI): ICD-10-CM

## 2024-03-22 RX ORDER — BLOOD-GLUCOSE METER
KIT MISCELLANEOUS
Qty: 100 STRIP | Refills: 4 | Status: SHIPPED | OUTPATIENT
Start: 2024-03-22

## 2024-03-26 DIAGNOSIS — N40.0 BENIGN PROSTATIC HYPERPLASIA, UNSPECIFIED WHETHER LOWER URINARY TRACT SYMPTOMS PRESENT: ICD-10-CM

## 2024-03-26 DIAGNOSIS — D50.9 IRON DEFICIENCY ANEMIA, UNSPECIFIED IRON DEFICIENCY ANEMIA TYPE: ICD-10-CM

## 2024-03-26 DIAGNOSIS — B35.4 TINEA CORPORIS: ICD-10-CM

## 2024-03-26 RX ORDER — FINASTERIDE 5 MG/1
5 TABLET, FILM COATED ORAL DAILY
Qty: 90 TABLET | Refills: 1 | Status: SHIPPED | OUTPATIENT
Start: 2024-03-26

## 2024-03-26 RX ORDER — KETOCONAZOLE 20 MG/ML
SHAMPOO, SUSPENSION TOPICAL
Qty: 120 ML | Refills: 2 | Status: SHIPPED | OUTPATIENT
Start: 2024-03-26

## 2024-03-27 RX ORDER — FERROUS SULFATE TAB 325 MG (65 MG ELEMENTAL FE) 325 (65 FE) MG
TAB ORAL
Qty: 90 TABLET | Refills: 1 | Status: SHIPPED | OUTPATIENT
Start: 2024-03-27

## 2024-04-05 DIAGNOSIS — K21.9 GASTROESOPHAGEAL REFLUX DISEASE, UNSPECIFIED WHETHER ESOPHAGITIS PRESENT: ICD-10-CM

## 2024-04-05 RX ORDER — PANTOPRAZOLE SODIUM 40 MG/1
TABLET, DELAYED RELEASE ORAL
Qty: 90 TABLET | Refills: 3 | Status: SHIPPED | OUTPATIENT
Start: 2024-04-05

## 2024-04-15 ENCOUNTER — TELEPHONE (OUTPATIENT)
Dept: PRIMARY CARE | Facility: CLINIC | Age: 79
End: 2024-04-15
Payer: MEDICARE

## 2024-04-15 DIAGNOSIS — E78.5 HYPERLIPIDEMIA, UNSPECIFIED HYPERLIPIDEMIA TYPE: ICD-10-CM

## 2024-04-15 DIAGNOSIS — E78.5 TYPE 2 DIABETES MELLITUS WITH HYPERLIPIDEMIA (MULTI): ICD-10-CM

## 2024-04-15 DIAGNOSIS — E11.69 TYPE 2 DIABETES MELLITUS WITH HYPERLIPIDEMIA (MULTI): ICD-10-CM

## 2024-04-15 NOTE — TELEPHONE ENCOUNTER
I made an appointment for patient for medication check (5/22/24) and he needs Pravastatin 20 mg he takes it on  Monday, Wednesday and Friday he states physician agreed to this.  Please send to [facility] on [address].

## 2024-04-16 RX ORDER — PRAVASTATIN SODIUM 20 MG/1
20 TABLET ORAL 3 TIMES WEEKLY
Qty: 90 TABLET | Refills: 1 | Status: SHIPPED | OUTPATIENT
Start: 2024-04-17

## 2024-04-17 ENCOUNTER — OFFICE VISIT (OUTPATIENT)
Dept: OTOLARYNGOLOGY | Facility: CLINIC | Age: 79
End: 2024-04-17
Payer: MEDICARE

## 2024-04-17 DIAGNOSIS — J32.9 CHRONIC RHINOSINUSITIS: ICD-10-CM

## 2024-04-17 DIAGNOSIS — R09.82 POSTNASAL DISCHARGE: Primary | ICD-10-CM

## 2024-04-17 DIAGNOSIS — J31.0 GUSTATORY RHINITIS: ICD-10-CM

## 2024-04-17 PROCEDURE — 1160F RVW MEDS BY RX/DR IN RCRD: CPT | Performed by: OTOLARYNGOLOGY

## 2024-04-17 PROCEDURE — 1157F ADVNC CARE PLAN IN RCRD: CPT | Performed by: OTOLARYNGOLOGY

## 2024-04-17 PROCEDURE — 99213 OFFICE O/P EST LOW 20 MIN: CPT | Performed by: OTOLARYNGOLOGY

## 2024-04-17 PROCEDURE — 1159F MED LIST DOCD IN RCRD: CPT | Performed by: OTOLARYNGOLOGY

## 2024-04-17 PROCEDURE — 1036F TOBACCO NON-USER: CPT | Performed by: OTOLARYNGOLOGY

## 2024-04-17 RX ORDER — SOD CHLOR,BICARB/SQUEEZ BOTTLE
1 PACKET, WITH RINSE DEVICE NASAL 2 TIMES DAILY
Qty: 60 EACH | Refills: 0 | Status: SHIPPED | OUTPATIENT
Start: 2024-04-17 | End: 2024-05-17

## 2024-04-17 RX ORDER — IPRATROPIUM BROMIDE 42 UG/1
2 SPRAY, METERED NASAL 2 TIMES DAILY
Qty: 15 ML | Refills: 3 | Status: SHIPPED | OUTPATIENT
Start: 2024-04-17 | End: 2024-04-26 | Stop reason: WASHOUT

## 2024-04-17 RX ORDER — MUPIROCIN 20 MG/G
OINTMENT TOPICAL
Qty: 22 G | Refills: 0 | Status: SHIPPED | OUTPATIENT
Start: 2024-04-17

## 2024-04-17 NOTE — PROGRESS NOTES
"History Of Present Illness    04.17.2024: He constantly clears his throat. Continues to feel postnasal discharge.  Secondly, if he eats, his nose starts to drip more.     Plan:  1- generic atrovent spray  2- mupirocin rinse  3- follow up in one month  ______________________________________________________________________    Daryl Ferrara is a 79 y.o. male presenting with: \"Postnasal drip\".  He is kindly referred by Dr. Bunny Floyd.    He also has tinnitus for 50-60 years.  He has postnasal discharge for years. He constantly clears his throat.  He never brought it up.    He thinks he has GERD.  Allergic rhinitis: not really. Perhaps he is allergic to dust.    He is informed about lenire.  On examination, there was some purulent yellowish dried secretion at nasopharynx.  It was suctioned out.  Topical antiseptic agent was applied.  Minimal arytenoid edema.  Vocal cords are mobile bilaterally.  I have not noticed any purulent material running down to her larynx.    Plan  1-continue PPI once a day  2-oral doxycycline  3-follow-up in 3 months       Past Medical History  He has a past medical history of Abdominal bloating (09/13/2023), Acute bronchitis (06/05/2023), Cough (06/05/2023), Fall at home (06/05/2023), Hemangioma of skin and subcutaneous tissue (03/27/2023), Left knee pain (02/13/2020), Lower abdominal pain (11/08/2018), Lower urinary tract symptoms (08/01/2004), Other symbolic dysfunctions, Periodic limb movement disorder, Personal history of other diseases of the musculoskeletal system and connective tissue, Personal history of other diseases of the nervous system and sense organs, Personal history of other specified conditions, Personal history of other specified conditions, Rash (07/01/2021), Tick bite of abdominal wall (04/30/2020), and Urinary incontinence (09/08/2022).    Surgical History  He has a past surgical history that includes Other surgical history (06/15/2021) and Other surgical " history (06/15/2021).     Social History  He reports that he has quit smoking. His smoking use included cigarettes. He has never used smokeless tobacco. He reports that he does not currently use alcohol. He reports that he does not use drugs.    Family History  No family history on file.     Allergies  Penicillins, Atorvastatin, Lisinopril, Losartan, and Pravastatin sodium    Review of Systems (initial ros)  Tinnitus  Nasal discharge       Physical Exam (old exam)    General appearance: Healthy-appearing, well-nourished, well groomed, in no acute distress.     Head and Face: Atraumatic with no masses, lesions, or scarring.      Salivary glands: No tenderness of the parotid glands or parotid masses.     No tenderness of the submandibular glands or submandibular masses.      Facial strength: Normal strength and symmetry, no synkinesis or facial tic.     Eyes: Conjunctivas look non-hyperemic bilaterally    Ears: Bilaterally ear canals look normal. Tympanic membranes look intact, no hyperemia, fluid or retraction. Hearing grossly normal.      Nose: Mucosa looks normal. No purulent discharge. Septum essentially straight.     Oral Cavity/Mouth: Lips and tongue look normal.     Throat: No postnasal discharge. No tonsil hypertrophy. No hyperemia.    Neck: Symmetrical, trachea midline.     Pulmonary: Normal respiratory effort.     Lymphatic: No palpable pathologic lymph nodes at neck.     Neurological/Psychiatric Orientation to person, place, and time: Normal.     Mood and affect: Normal.      Extremities: No clubbing.     Skin: No significant skin lesions were noted at face or neck        Procedure  FLEXIBLE ENDOSCOPY 01.12.2024  After application of topical lidocaine and decongestant, flexible endoscope was advanced through patient's nasal cavities. On examination, there was some purulent yellowish dried secretion at nasopharynx.  It was suctioned out.  Topical antiseptic agent was applied.  Minimal arytenoid edema.  Vocal  "cords are mobile bilaterally.  I have not noticed any purulent material running down to her larynx.         Last Recorded Vitals  There were no vitals taken for this visit.    Assessment and Plan:    04.17.2024: He constantly clears his throat. Continues to feel postnasal discharge.  Secondly, if he eats, his nose starts to drip more.     Plan:  1- generic atrovent spray  2- mupirocin rinse  3- follow up in one month  ______________________________________________________________________    Daryl Jacob Ferrara is a 78 y.o. male presenting with: \"Postnasal drip\".  He is kindly referred by Dr. Bunny Floyd.    He also has tinnitus for 50-60 years.  He has postnasal discharge for years. He constantly clears his throat.  He never brought it up.    He thinks he has GERD.  Allergic rhinitis: not really. Perhaps he is allergic to dust.    He is informed about lenire.  On examination, there was some purulent yellowish dried secretion at nasopharynx.  It was suctioned out.  Topical antiseptic agent was applied.  Minimal arytenoid edema.  Vocal cords are mobile bilaterally.  I have not noticed any purulent material running down to her larynx.    Plan  1-continue PPI once a day  2-oral doxycycline  3-follow-up in 3 months  4- consider lenire for tinnitus      Tegan Chiu  Otolaryngology - Head & Neck Surgery  "

## 2024-04-19 ENCOUNTER — LAB (OUTPATIENT)
Dept: LAB | Facility: LAB | Age: 79
End: 2024-04-19
Payer: MEDICARE

## 2024-04-20 ENCOUNTER — APPOINTMENT (OUTPATIENT)
Dept: LAB | Facility: LAB | Age: 79
End: 2024-04-20
Payer: MEDICARE

## 2024-04-22 ENCOUNTER — TELEPHONE (OUTPATIENT)
Dept: PRIMARY CARE | Facility: CLINIC | Age: 79
End: 2024-04-22
Payer: MEDICARE

## 2024-04-22 DIAGNOSIS — E11.69 TYPE 2 DIABETES MELLITUS WITH HYPERLIPIDEMIA (MULTI): ICD-10-CM

## 2024-04-22 DIAGNOSIS — E78.5 HYPERLIPIDEMIA, UNSPECIFIED HYPERLIPIDEMIA TYPE: ICD-10-CM

## 2024-04-22 DIAGNOSIS — E78.5 TYPE 2 DIABETES MELLITUS WITH HYPERLIPIDEMIA (MULTI): ICD-10-CM

## 2024-04-22 NOTE — TELEPHONE ENCOUNTER
Patient originally had an appointment on 05-22-24, he's at the lab and wants his lab to be changed to state he can give blood for his appointment on 04-26-24.    Patient can be reached at [phone].

## 2024-04-22 NOTE — TELEPHONE ENCOUNTER
Changed the date to today on labs. Pended to [facility] to sign off on. Called patient to inform him it won't be available until later today.

## 2024-04-24 ENCOUNTER — LAB (OUTPATIENT)
Dept: LAB | Facility: LAB | Age: 79
End: 2024-04-24
Payer: MEDICARE

## 2024-04-24 DIAGNOSIS — E11.69 TYPE 2 DIABETES MELLITUS WITH HYPERLIPIDEMIA (MULTI): ICD-10-CM

## 2024-04-24 DIAGNOSIS — E78.5 HYPERLIPIDEMIA, UNSPECIFIED HYPERLIPIDEMIA TYPE: ICD-10-CM

## 2024-04-24 DIAGNOSIS — E78.5 TYPE 2 DIABETES MELLITUS WITH HYPERLIPIDEMIA (MULTI): ICD-10-CM

## 2024-04-24 LAB
ALBUMIN SERPL BCP-MCNC: 4.4 G/DL (ref 3.4–5)
ALP SERPL-CCNC: 86 U/L (ref 33–136)
ALT SERPL W P-5'-P-CCNC: 19 U/L (ref 10–52)
ANION GAP SERPL CALC-SCNC: 12 MMOL/L (ref 10–20)
APPEARANCE UR: CLEAR
AST SERPL W P-5'-P-CCNC: 19 U/L (ref 9–39)
BILIRUB SERPL-MCNC: 0.4 MG/DL (ref 0–1.2)
BILIRUB UR STRIP.AUTO-MCNC: NEGATIVE MG/DL
BUN SERPL-MCNC: 21 MG/DL (ref 6–23)
CALCIUM SERPL-MCNC: 9.3 MG/DL (ref 8.6–10.3)
CHLORIDE SERPL-SCNC: 102 MMOL/L (ref 98–107)
CHOLEST SERPL-MCNC: 158 MG/DL (ref 0–199)
CHOLESTEROL/HDL RATIO: 3.7
CO2 SERPL-SCNC: 29 MMOL/L (ref 21–32)
COLOR UR: YELLOW
CREAT SERPL-MCNC: 0.94 MG/DL (ref 0.5–1.3)
CREAT UR-MCNC: 101.3 MG/DL (ref 20–370)
EGFRCR SERPLBLD CKD-EPI 2021: 82 ML/MIN/1.73M*2
EST. AVERAGE GLUCOSE BLD GHB EST-MCNC: 146 MG/DL
GLUCOSE SERPL-MCNC: 132 MG/DL (ref 74–99)
GLUCOSE UR STRIP.AUTO-MCNC: NEGATIVE MG/DL
HBA1C MFR BLD: 6.7 %
HDLC SERPL-MCNC: 42.9 MG/DL
KETONES UR STRIP.AUTO-MCNC: NEGATIVE MG/DL
LDLC SERPL CALC-MCNC: 81 MG/DL
LEUKOCYTE ESTERASE UR QL STRIP.AUTO: NEGATIVE
MICROALBUMIN UR-MCNC: <7 MG/L
MICROALBUMIN/CREAT UR: NORMAL MG/G{CREAT}
NITRITE UR QL STRIP.AUTO: NEGATIVE
NON HDL CHOLESTEROL: 115 MG/DL (ref 0–149)
PH UR STRIP.AUTO: 5 [PH]
POTASSIUM SERPL-SCNC: 4.3 MMOL/L (ref 3.5–5.3)
PROT SERPL-MCNC: 6.5 G/DL (ref 6.4–8.2)
PROT UR STRIP.AUTO-MCNC: NEGATIVE MG/DL
RBC # UR STRIP.AUTO: NEGATIVE /UL
SODIUM SERPL-SCNC: 139 MMOL/L (ref 136–145)
SP GR UR STRIP.AUTO: 1.02
TRIGL SERPL-MCNC: 173 MG/DL (ref 0–149)
UROBILINOGEN UR STRIP.AUTO-MCNC: <2 MG/DL
VLDL: 35 MG/DL (ref 0–40)

## 2024-04-24 PROCEDURE — 82570 ASSAY OF URINE CREATININE: CPT

## 2024-04-24 PROCEDURE — 80061 LIPID PANEL: CPT

## 2024-04-24 PROCEDURE — 80053 COMPREHEN METABOLIC PANEL: CPT

## 2024-04-24 PROCEDURE — 81003 URINALYSIS AUTO W/O SCOPE: CPT

## 2024-04-24 PROCEDURE — 82043 UR ALBUMIN QUANTITATIVE: CPT

## 2024-04-24 PROCEDURE — 83036 HEMOGLOBIN GLYCOSYLATED A1C: CPT

## 2024-04-24 PROCEDURE — 36415 COLL VENOUS BLD VENIPUNCTURE: CPT

## 2024-04-26 ENCOUNTER — TELEPHONE (OUTPATIENT)
Dept: PRIMARY CARE | Facility: CLINIC | Age: 79
End: 2024-04-26

## 2024-04-26 ENCOUNTER — OFFICE VISIT (OUTPATIENT)
Dept: PRIMARY CARE | Facility: CLINIC | Age: 79
End: 2024-04-26
Payer: MEDICARE

## 2024-04-26 VITALS
OXYGEN SATURATION: 96 % | DIASTOLIC BLOOD PRESSURE: 80 MMHG | SYSTOLIC BLOOD PRESSURE: 136 MMHG | BODY MASS INDEX: 27.62 KG/M2 | HEIGHT: 67 IN | WEIGHT: 176 LBS | HEART RATE: 63 BPM

## 2024-04-26 DIAGNOSIS — E78.5 TYPE 2 DIABETES MELLITUS WITH HYPERLIPIDEMIA (MULTI): ICD-10-CM

## 2024-04-26 DIAGNOSIS — I10 BENIGN ESSENTIAL HYPERTENSION: ICD-10-CM

## 2024-04-26 DIAGNOSIS — E78.5 HYPERLIPIDEMIA, UNSPECIFIED HYPERLIPIDEMIA TYPE: ICD-10-CM

## 2024-04-26 DIAGNOSIS — E11.69 TYPE 2 DIABETES MELLITUS WITH HYPERLIPIDEMIA (MULTI): ICD-10-CM

## 2024-04-26 DIAGNOSIS — D64.9 ANEMIA, UNSPECIFIED TYPE: Primary | ICD-10-CM

## 2024-04-26 DIAGNOSIS — E78.5 HYPERLIPIDEMIA, UNSPECIFIED HYPERLIPIDEMIA TYPE: Primary | ICD-10-CM

## 2024-04-26 PROCEDURE — 1159F MED LIST DOCD IN RCRD: CPT | Performed by: FAMILY MEDICINE

## 2024-04-26 PROCEDURE — 1160F RVW MEDS BY RX/DR IN RCRD: CPT | Performed by: FAMILY MEDICINE

## 2024-04-26 PROCEDURE — 3075F SYST BP GE 130 - 139MM HG: CPT | Performed by: FAMILY MEDICINE

## 2024-04-26 PROCEDURE — 3079F DIAST BP 80-89 MM HG: CPT | Performed by: FAMILY MEDICINE

## 2024-04-26 PROCEDURE — 1157F ADVNC CARE PLAN IN RCRD: CPT | Performed by: FAMILY MEDICINE

## 2024-04-26 PROCEDURE — 1036F TOBACCO NON-USER: CPT | Performed by: FAMILY MEDICINE

## 2024-04-26 PROCEDURE — 99214 OFFICE O/P EST MOD 30 MIN: CPT | Performed by: FAMILY MEDICINE

## 2024-04-26 PROCEDURE — 1126F AMNT PAIN NOTED NONE PRSNT: CPT | Performed by: FAMILY MEDICINE

## 2024-04-26 ASSESSMENT — PATIENT HEALTH QUESTIONNAIRE - PHQ9
2. FEELING DOWN, DEPRESSED OR HOPELESS: NOT AT ALL
SUM OF ALL RESPONSES TO PHQ9 QUESTIONS 1 AND 2: 0
1. LITTLE INTEREST OR PLEASURE IN DOING THINGS: NOT AT ALL

## 2024-04-26 ASSESSMENT — PAIN SCALES - GENERAL: PAINLEVEL: 0-NO PAIN

## 2024-04-26 NOTE — PROGRESS NOTES
"Diabetes knee pain Subjective   Patient ID: Daryl Ferrara is a 79 y.o. male who presents for Hyperlipidemia, Diabetes, and Neck Pain (Some neck discomfort with movement x several weeks. No injury/Eye exam-  Sees Dr. Choudhary yearly).    HPI DARYL is seen also has NIDDM.  years. He is tolerating his medications. DARYL has not had diabetic complications. DARYL is compliant with his medications. DARYL is compliant with doing his fingersticks.   HgbA1c is 6.7.  It was 6.4 about 6 months ago.He is on metformin 1000 milligrams twice a day.  Also on pioglitazone 30 mg qd.   DARYL is here also for hypertension. Ias had intermittent BP elevations over the years , on losartan 50 mg qd.   DARYL is seen today also has Hypercholesterolemia.  could not tolerate Lipitor or pravastatin, with Myalgias. now tolerating pravastatin M,W,F DARYL has the following cardiac risk factors: coronary artery disease and diabetes .  coronary CA score positive in 2017,  Nuc stress test neg for ischemia.  On pravastatin 20 mg qd.  Patient has had a few episodes of sharp crack sound in his neck with an electric cervical pain to the area that lasts for a second.  It then resolved spontaneously.  Review of Systems  Constitutional: Patient is negative for fever, fatigue, weight change.  HEENT: Patient is negative for change in vision, hearing, swallow.  Cardio: Patient is negative for chest pain, lower extremity edema.  Pulmonary: Patient is negative for cough, shortness of breath.  Musculoskeletal: Patient is positive for arthralgias throughout his feet.  Objective   /80   Pulse 63   Ht 1.702 m (5' 7\")   Wt 79.8 kg (176 lb)   SpO2 96%   BMI 27.57 kg/m²     Physical Exam  General: Awake and alert no apparent distress.  HEENT: Moist oral mucosa no cervical lymphadenopathy.  Cardio: Heart S1-S2 no murmur rub or gallop.  Pulmonary: Lungs clear to auscultation bilaterally.  Assessment/Plan   Problem List Items Addressed This Visit  "            ICD-10-CM    Type 2 diabetes mellitus with hyperlipidemia (Multi).   Stable.  Continue on metformin and pioglitazone.  Patient will follow-up in 6 months for CPE.   E11.69, E78.5    Benign essential hypertension stable.  Continue on losartan 50 mg daily. I10     Other Visit Diagnoses         Codes    Hyperlipidemia, unspecified hyperlipidemia type    -  Primary   stable.  Continue on pravastatin 20 mg 3 times a week. E78.5

## 2024-04-29 ENCOUNTER — TELEPHONE (OUTPATIENT)
Dept: PRIMARY CARE | Facility: CLINIC | Age: 79
End: 2024-04-29
Payer: MEDICARE

## 2024-04-29 DIAGNOSIS — E78.5 TYPE 2 DIABETES MELLITUS WITH HYPERLIPIDEMIA (MULTI): ICD-10-CM

## 2024-04-29 DIAGNOSIS — E11.69 TYPE 2 DIABETES MELLITUS WITH HYPERLIPIDEMIA (MULTI): ICD-10-CM

## 2024-04-29 NOTE — TELEPHONE ENCOUNTER
Patient stopped in to get a prescription for Custom Diabetic Inserts, he needs 2 pairs of inserts sent to [facility] on [facility].    If this is needed to be faxed over send to [phone] ATTN: Krista Gonzalez    Patient contact: [phone]

## 2024-05-02 NOTE — TELEPHONE ENCOUNTER
Rx printed and faxed to [facility] as requested per patient.  Spoke with patient and he is aware.

## 2024-05-15 ENCOUNTER — APPOINTMENT (OUTPATIENT)
Dept: OTOLARYNGOLOGY | Facility: CLINIC | Age: 79
End: 2024-05-15
Payer: MEDICARE

## 2024-05-22 ENCOUNTER — APPOINTMENT (OUTPATIENT)
Dept: OTOLARYNGOLOGY | Facility: CLINIC | Age: 79
End: 2024-05-22
Payer: MEDICARE

## 2024-05-22 ENCOUNTER — APPOINTMENT (OUTPATIENT)
Dept: PRIMARY CARE | Facility: CLINIC | Age: 79
End: 2024-05-22
Payer: MEDICARE

## 2024-05-23 NOTE — PROGRESS NOTES
Subjective   Patient ID: Daryl Ferrara is a 79 y.o. male who presents for No chief complaint on file..    HPI     Review of Systems    Objective   There were no vitals taken for this visit.    Physical Exam    Assessment/Plan

## 2024-07-18 ENCOUNTER — APPOINTMENT (OUTPATIENT)
Dept: SLEEP MEDICINE | Facility: CLINIC | Age: 79
End: 2024-07-18
Payer: MEDICARE

## 2024-07-18 VITALS
HEIGHT: 67 IN | DIASTOLIC BLOOD PRESSURE: 66 MMHG | BODY MASS INDEX: 27 KG/M2 | WEIGHT: 172 LBS | HEART RATE: 83 BPM | OXYGEN SATURATION: 94 % | SYSTOLIC BLOOD PRESSURE: 120 MMHG

## 2024-07-18 DIAGNOSIS — Z45.42 ENCOUNTER FOR ADJUSTMENT AND MANAGEMENT OF NEUROSTIMULATOR: ICD-10-CM

## 2024-07-18 DIAGNOSIS — D50.9 IRON DEFICIENCY ANEMIA, UNSPECIFIED IRON DEFICIENCY ANEMIA TYPE: Primary | ICD-10-CM

## 2024-07-18 DIAGNOSIS — G47.33 OBSTRUCTIVE SLEEP APNEA, ADULT: ICD-10-CM

## 2024-07-18 PROCEDURE — 1160F RVW MEDS BY RX/DR IN RCRD: CPT | Performed by: INTERNAL MEDICINE

## 2024-07-18 PROCEDURE — 3074F SYST BP LT 130 MM HG: CPT | Performed by: INTERNAL MEDICINE

## 2024-07-18 PROCEDURE — 1036F TOBACCO NON-USER: CPT | Performed by: INTERNAL MEDICINE

## 2024-07-18 PROCEDURE — 95970 ALYS NPGT W/O PRGRMG: CPT | Performed by: INTERNAL MEDICINE

## 2024-07-18 PROCEDURE — 1159F MED LIST DOCD IN RCRD: CPT | Performed by: INTERNAL MEDICINE

## 2024-07-18 PROCEDURE — 1126F AMNT PAIN NOTED NONE PRSNT: CPT | Performed by: INTERNAL MEDICINE

## 2024-07-18 PROCEDURE — 1157F ADVNC CARE PLAN IN RCRD: CPT | Performed by: INTERNAL MEDICINE

## 2024-07-18 PROCEDURE — 99214 OFFICE O/P EST MOD 30 MIN: CPT | Performed by: INTERNAL MEDICINE

## 2024-07-18 PROCEDURE — 3078F DIAST BP <80 MM HG: CPT | Performed by: INTERNAL MEDICINE

## 2024-07-18 ASSESSMENT — SLEEP AND FATIGUE QUESTIONNAIRES
SITING INACTIVE IN A PUBLIC PLACE LIKE A CLASS ROOM OR A MOVIE THEATER: WOULD NEVER DOZE
ESS-CHAD TOTAL SCORE: 6
HOW LIKELY ARE YOU TO NOD OFF OR FALL ASLEEP WHILE SITTING AND TALKING TO SOMEONE: WOULD NEVER DOZE
HOW LIKELY ARE YOU TO NOD OFF OR FALL ASLEEP WHILE WATCHING TV: SLIGHT CHANCE OF DOZING
HOW LIKELY ARE YOU TO NOD OFF OR FALL ASLEEP WHILE SITTING AND READING: MODERATE CHANCE OF DOZING
HOW LIKELY ARE YOU TO NOD OFF OR FALL ASLEEP WHILE SITTING QUIETLY AFTER LUNCH WITHOUT ALCOHOL: WOULD NEVER DOZE
HOW LIKELY ARE YOU TO NOD OFF OR FALL ASLEEP WHEN YOU ARE A PASSENGER IN A CAR FOR AN HOUR WITHOUT A BREAK: WOULD NEVER DOZE
HOW LIKELY ARE YOU TO NOD OFF OR FALL ASLEEP IN A CAR, WHILE STOPPED FOR A FEW MINUTES IN TRAFFIC: WOULD NEVER DOZE
HOW LIKELY ARE YOU TO NOD OFF OR FALL ASLEEP WHILE LYING DOWN TO REST IN THE AFTERNOON WHEN CIRCUMSTANCES PERMIT: HIGH CHANCE OF DOZING

## 2024-07-18 ASSESSMENT — PAIN SCALES - GENERAL: PAINLEVEL: 0-NO PAIN

## 2024-07-18 NOTE — ASSESSMENT & PLAN NOTE
Not able to obtain objective adherence today. He reports good response and benefit. Continue current treatment plan.  Follow-up 6 months

## 2024-07-18 NOTE — PROGRESS NOTES
Subjective   Patient ID: Daryl Ferrara is a 79 y.o. male who presents for Sleep Apnea and Inspire - Followup.  HPI    Prior sleep history:  04/20/2023: DARYL presents for routine visit to monitor response to Inspire therapy. The patient continues to report significant benefit from the device. DARYL denies any significant side effects or intolerance of therapy.  He reports that he is getting a tingling sensation in his neck and right shoulder any time of the day. He naps in a recliner occasionally. He is currently using 1.6v and does not believe that he can increase therapy. He believes that he may be more rested in the morning, but he believes that his medications are causing him to feel tired. The waveform was checked with adequate rise/fall. Tongue motion was assessed and deemed appropriate.  Implant date 10/20/2021  Prior Sleep studies: Fine tune study 3/11/22, therapeutic amplitude 1.7 v on default (+, -, +). Incoming amplitude (range) :1.6 v ( 1.6- 1.9v). Usage 40 hours per week. Start delay 30 minutes . Pause/delay 20 minutes . Duration of therapy 8 hours .   He had a therapeutic amplitude of 1.7 v during his prior fine tune titration,  07/20/2023 : Nights used > 4 hours: 78%. Hours used per night:5.1. Pauses per night: 1.4. Amplitude where the patient utilized therapy the most 1.7 volts. Functional threshold on 07/20/2023 1.2 volts     PERIODIC LIMB MOVEMENTS with a PMI 58 on sleep testing. He does have low ferritin low iron stores, denies RLS symptoms. He was prescribed oral iron but stopped due to constipation.    BALANCE ISSUES he is scheduled for an MRI through neurology  1/18/2024: Office Visit: Dr. Ko Pearson: Neurology diagnosed him with neuropathy and flatfoot, undergoing PT.  Order to recheck iron levels for therapeutic amplitude inspire therapy  Current sleep history:    Inspire appointment to evaluate Daryl Ferrara 's Sleep Apnea and Inspire - Followup    and response to  "therapy and iron supplementation    Daryl denies  issues with his Inspire therapy.  He is taking oral iron every other day. Reports it has caused constipation in the past, but not currently.     Inspire remote interrogation:   The Inspire cloud was not available for review.    INCOMING SETTINGS:   BIPOLAR CONFIGURATION (DEFAULT) : (+, -, +)    Incoming Amplitude 1.7 volts. Incoming Range ( 1.5 - 1.7 v). Rate 33 Hz. Pulse Width 90 µs.     Start delay 30   minutes. Pause/delay 20  minutes . Duration of therapy 8 hours.       DEVICE INTERROGATION:  Average usage hours per week: 3 hours determined from device interrogation    Electrode configurations tested:    BIPOLAR CONFIGURATION (DEFAULT) : (+, -, +)    CHANGES MADE AT TODAYS VISIT:   Outgoing settings:     BIPOLAR CONFIGURATION (DEFAULT) : (+, -, +)    Outgoing amplitude 1.7 volts. Range adjusted to ( 1.5 - 1.7 volts) . Therapeutic amplitude 1.7 volts. Rate 33 Hz. Pulse Width 90 µs    Start delay 30   minutes. Pause/delay 20  minutes . Duration of therapy 8 hours .    The waveform was evaluated. An adequate rise and fall was observed.    Review of Systems  Review of systems negative except as per HPI   [place] Sleepiness Scale: 6     Objective   /66   Pulse 83   Ht 1.702 m (5' 7\")   Wt 78 kg (172 lb)   SpO2 94%   BMI 26.94 kg/m²    PREVIOUS WEIGHTS:  Wt Readings from Last 3 Encounters:   07/18/24 78 kg (172 lb)   04/26/24 79.8 kg (176 lb)   01/18/24 78 kg (172 lb)     Physical Exam  PHYSICAL EXAM: GENERAL: alert pleasant and cooperative no acute distress  PSYCH EXAM: alert,oriented, in NAD with a full range of affect, normal behavior and no psychotic features   Tongue motion was assessed:  tongue protrusion was midline, no fasciculations noted  Tongue protrusion with stimulation was  tongued protruded to the left without fasciculations    Labs:    Ferritin   Date/Time Value Ref Range Status   02/05/2024 01:09 PM 70 20 - 300 ng/mL Final     Iron "   Date/Time Value Ref Range Status   02/05/2024 01:09 PM 56 35 - 150 ug/dL Final     TIBC   Date/Time Value Ref Range Status   02/05/2024 01:09  240 - 445 ug/dL Final     % Saturation   Date/Time Value Ref Range Status   02/05/2024 01:09 PM 15 (L) 25 - 45 % Final      Assessment/Plan   Problem List Items Addressed This Visit             ICD-10-CM    Anemia - Primary D64.9    Relevant Orders    Ferritin    Iron and TIBC    Obstructive sleep apnea, adult G47.33     Not able to obtain objective adherence today. He reports good response and benefit. Continue current treatment plan.  Follow-up 6 months         Encounter for adjustment and management of neurostimulator Z45.42     Outgoing settings:     BIPOLAR CONFIGURATION (DEFAULT) : (+, -, +)    Outgoing amplitude 1.7 volts. Range adjusted to ( 1.5 - 1.7 volts) . Therapeutic amplitude 1.7 volts. Rate 33 Hz. Pulse Width 90 µs    Start delay 30   minutes. Pause/delay 20  minutes . Duration of therapy 8 hours .    The waveform was evaluated. An adequate rise and fall was observed.

## 2024-08-06 ENCOUNTER — LAB (OUTPATIENT)
Dept: LAB | Facility: LAB | Age: 79
End: 2024-08-06
Payer: MEDICARE

## 2024-08-06 DIAGNOSIS — D50.9 IRON DEFICIENCY ANEMIA, UNSPECIFIED IRON DEFICIENCY ANEMIA TYPE: ICD-10-CM

## 2024-08-06 LAB
FERRITIN SERPL-MCNC: 67 NG/ML (ref 20–300)
IRON SATN MFR SERPL: 15 % (ref 25–45)
IRON SERPL-MCNC: 48 UG/DL (ref 35–150)
TIBC SERPL-MCNC: 331 UG/DL (ref 240–445)
UIBC SERPL-MCNC: 283 UG/DL (ref 110–370)

## 2024-08-06 PROCEDURE — 83540 ASSAY OF IRON: CPT

## 2024-08-06 PROCEDURE — 82728 ASSAY OF FERRITIN: CPT

## 2024-08-06 PROCEDURE — 36415 COLL VENOUS BLD VENIPUNCTURE: CPT

## 2024-08-06 PROCEDURE — 83550 IRON BINDING TEST: CPT

## 2024-08-23 ENCOUNTER — OFFICE VISIT (OUTPATIENT)
Dept: PRIMARY CARE | Facility: CLINIC | Age: 79
End: 2024-08-23
Payer: MEDICARE

## 2024-08-23 VITALS
SYSTOLIC BLOOD PRESSURE: 130 MMHG | OXYGEN SATURATION: 94 % | WEIGHT: 172 LBS | HEART RATE: 72 BPM | DIASTOLIC BLOOD PRESSURE: 60 MMHG | HEIGHT: 67 IN | BODY MASS INDEX: 27 KG/M2

## 2024-08-23 DIAGNOSIS — M25.512 ACUTE PAIN OF LEFT SHOULDER: Primary | ICD-10-CM
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
## 2024-08-23 PROBLEM — R26.89 IMPAIRMENT OF BALANCE: Status: ACTIVE | Noted: 2024-08-23

## 2024-08-23 PROBLEM — R47.01 ANOMIC APHASIA: Status: ACTIVE | Noted: 2024-08-23

## 2024-08-23 PROCEDURE — 1125F AMNT PAIN NOTED PAIN PRSNT: CPT

## 2024-08-23 PROCEDURE — 3078F DIAST BP <80 MM HG: CPT

## 2024-08-23 PROCEDURE — 1158F ADVNC CARE PLAN TLK DOCD: CPT

## 2024-08-23 PROCEDURE — 99213 OFFICE O/P EST LOW 20 MIN: CPT

## 2024-08-23 PROCEDURE — 3075F SYST BP GE 130 - 139MM HG: CPT

## 2024-08-23 PROCEDURE — 1157F ADVNC CARE PLAN IN RCRD: CPT

## 2024-08-23 PROCEDURE — 1123F ACP DISCUSS/DSCN MKR DOCD: CPT

## 2024-08-23 RX ORDER — PREDNISONE 20 MG/1
40 TABLET ORAL DAILY
Qty: 10 TABLET | Refills: 0 | Status: SHIPPED | OUTPATIENT
Start: 2024-08-23 | End: 2024-08-28

## 2024-08-23 ASSESSMENT — ENCOUNTER SYMPTOMS
NUMBNESS: 0
TINGLING: 0
FEVER: 0
LIMITED RANGE OF MOTION: 1

## 2024-08-23 ASSESSMENT — LIFESTYLE VARIABLES
SKIP TO QUESTIONS 9-10: 1
HOW OFTEN DO YOU HAVE A DRINK CONTAINING ALCOHOL: NEVER
HOW OFTEN DO YOU HAVE SIX OR MORE DRINKS ON ONE OCCASION: NEVER
AUDIT-C TOTAL SCORE: 0
HOW MANY STANDARD DRINKS CONTAINING ALCOHOL DO YOU HAVE ON A TYPICAL DAY: PATIENT DOES NOT DRINK

## 2024-08-23 ASSESSMENT — PATIENT HEALTH QUESTIONNAIRE - PHQ9
SUM OF ALL RESPONSES TO PHQ9 QUESTIONS 1 AND 2: 0
1. LITTLE INTEREST OR PLEASURE IN DOING THINGS: NOT AT ALL
2. FEELING DOWN, DEPRESSED OR HOPELESS: NOT AT ALL

## 2024-08-23 ASSESSMENT — COLUMBIA-SUICIDE SEVERITY RATING SCALE - C-SSRS: 1. IN THE PAST MONTH, HAVE YOU WISHED YOU WERE DEAD OR WISHED YOU COULD GO TO SLEEP AND NOT WAKE UP?: NO

## 2024-08-23 ASSESSMENT — PAIN SCALES - GENERAL: PAINLEVEL: 5

## 2024-08-23 NOTE — PROGRESS NOTES
"Subjective   Patient ID: Daryl Ferrara is a 79 y.o. male who presents for left shoulder pain  (X few weeks /No injuries /Just a lot of yard work ).    Shoulder Pain   The pain is present in the left shoulder. This is a new problem. The current episode started 1 to 4 weeks ago. There has been no history of extremity trauma. The problem has been gradually worsening. The quality of the pain is described as aching and dull. Associated symptoms include a limited range of motion. Pertinent negatives include no fever, joint swelling, numbness or tingling. He has tried acetaminophen and NSAIDS for the symptoms. The treatment provided mild relief. His past medical history is significant for osteoarthritis.        Review of Systems   Constitutional:  Negative for fever.   Neurological:  Negative for tingling and numbness.       Objective   /60   Pulse 72   Ht 1.702 m (5' 7\")   Wt 78 kg (172 lb)   SpO2 94%   BMI 26.94 kg/m²     Physical Exam  Vitals and nursing note reviewed.   Constitutional:       General: He is not in acute distress.  Eyes:      Extraocular Movements: Extraocular movements intact.      Conjunctiva/sclera: Conjunctivae normal.   Cardiovascular:      Rate and Rhythm: Normal rate.   Pulmonary:      Effort: Pulmonary effort is normal.   Musculoskeletal:      Right shoulder: Normal.      Left shoulder: Tenderness present. No swelling or bony tenderness. Decreased range of motion (Abduction, flexion). Normal strength. Normal pulse.      Cervical back: Neck supple.   Skin:     General: Skin is warm.   Neurological:      General: No focal deficit present.      Mental Status: He is alert.      Sensory: No sensory deficit.      Motor: No weakness.   Psychiatric:         Mood and Affect: Mood normal.         Assessment/Plan   Assessment & Plan  Acute pain of left shoulder  Acute, suspect MSK strain.   Prednisone as directed. Risks and benefits of medication discussed and prescribed.   OTC Tylenol as " directed for pain. Rest, heat, stretching, topical analgesics.   Follow up if symptoms do not improve within 1-2 weeks, or sooner for worsening.     Orders:    predniSONE (Deltasone) 20 mg tablet; Take 2 tablets (40 mg) by mouth once daily for 5 days.

## 2024-09-07 DIAGNOSIS — E11.69 TYPE 2 DIABETES MELLITUS WITH HYPERLIPIDEMIA (MULTI): ICD-10-CM

## 2024-09-07 DIAGNOSIS — I10 BENIGN ESSENTIAL HYPERTENSION: ICD-10-CM

## 2024-09-07 DIAGNOSIS — M54.50 LOW BACK PAIN, UNSPECIFIED BACK PAIN LATERALITY, UNSPECIFIED CHRONICITY, UNSPECIFIED WHETHER SCIATICA PRESENT: ICD-10-CM

## 2024-09-07 DIAGNOSIS — E78.5 HYPERLIPIDEMIA, UNSPECIFIED HYPERLIPIDEMIA TYPE: ICD-10-CM

## 2024-09-07 DIAGNOSIS — E78.5 TYPE 2 DIABETES MELLITUS WITH HYPERLIPIDEMIA (MULTI): ICD-10-CM

## 2024-09-09 RX ORDER — TADALAFIL 20 MG/1
TABLET ORAL
COMMUNITY
Start: 2024-08-26

## 2024-09-10 RX ORDER — PRAVASTATIN SODIUM 20 MG/1
20 TABLET ORAL 3 TIMES WEEKLY
Qty: 40 TABLET | Refills: 3 | Status: SHIPPED | OUTPATIENT
Start: 2024-09-11

## 2024-09-10 RX ORDER — MELOXICAM 15 MG/1
TABLET ORAL
Qty: 45 TABLET | Refills: 1 | Status: SHIPPED | OUTPATIENT
Start: 2024-09-10

## 2024-09-10 RX ORDER — LOSARTAN POTASSIUM 50 MG/1
50 TABLET ORAL DAILY
Qty: 90 TABLET | Refills: 3 | Status: SHIPPED | OUTPATIENT
Start: 2024-09-10

## 2024-09-10 RX ORDER — METFORMIN HYDROCHLORIDE 1000 MG/1
TABLET ORAL
Qty: 180 TABLET | Refills: 3 | Status: SHIPPED | OUTPATIENT
Start: 2024-09-10

## 2024-09-16 ENCOUNTER — TELEPHONE (OUTPATIENT)
Dept: PRIMARY CARE | Facility: CLINIC | Age: 79
End: 2024-09-16
Payer: MEDICARE

## 2024-09-16 DIAGNOSIS — E11.9 TYPE 2 DIABETES MELLITUS WITHOUT COMPLICATIONS (MULTI): ICD-10-CM

## 2024-09-16 RX ORDER — BLOOD-GLUCOSE METER
KIT MISCELLANEOUS
Qty: 100 STRIP | Refills: 4 | Status: SHIPPED | OUTPATIENT
Start: 2024-09-16

## 2024-09-16 NOTE — TELEPHONE ENCOUNTER
Refill for    Free Style Lite strips    Pharmacy is [facility] in [place] on [address]    Patient can be reached at [phone]

## 2024-09-23 ENCOUNTER — LAB (OUTPATIENT)
Dept: LAB | Facility: LAB | Age: 79
End: 2024-09-23
Payer: MEDICARE

## 2024-09-23 DIAGNOSIS — E78.5 TYPE 2 DIABETES MELLITUS WITH HYPERLIPIDEMIA (MULTI): ICD-10-CM

## 2024-09-23 DIAGNOSIS — D64.9 ANEMIA, UNSPECIFIED TYPE: ICD-10-CM

## 2024-09-23 DIAGNOSIS — E78.5 HYPERLIPIDEMIA, UNSPECIFIED HYPERLIPIDEMIA TYPE: ICD-10-CM

## 2024-09-23 DIAGNOSIS — E11.69 TYPE 2 DIABETES MELLITUS WITH HYPERLIPIDEMIA (MULTI): ICD-10-CM

## 2024-09-23 LAB
ALBUMIN SERPL BCP-MCNC: 4.3 G/DL (ref 3.4–5)
ALP SERPL-CCNC: 78 U/L (ref 33–136)
ALT SERPL W P-5'-P-CCNC: 19 U/L (ref 10–52)
ANION GAP SERPL CALC-SCNC: 14 MMOL/L (ref 10–20)
AST SERPL W P-5'-P-CCNC: 18 U/L (ref 9–39)
BASOPHILS # BLD AUTO: 0.07 X10*3/UL (ref 0–0.1)
BASOPHILS NFR BLD AUTO: 0.8 %
BILIRUB SERPL-MCNC: 0.4 MG/DL (ref 0–1.2)
BUN SERPL-MCNC: 22 MG/DL (ref 6–23)
CALCIUM SERPL-MCNC: 9.4 MG/DL (ref 8.6–10.3)
CHLORIDE SERPL-SCNC: 101 MMOL/L (ref 98–107)
CHOLEST SERPL-MCNC: 181 MG/DL (ref 0–199)
CHOLESTEROL/HDL RATIO: 4.7
CO2 SERPL-SCNC: 27 MMOL/L (ref 21–32)
CREAT SERPL-MCNC: 0.9 MG/DL (ref 0.5–1.3)
EGFRCR SERPLBLD CKD-EPI 2021: 86 ML/MIN/1.73M*2
EOSINOPHIL # BLD AUTO: 0.25 X10*3/UL (ref 0–0.4)
EOSINOPHIL NFR BLD AUTO: 3 %
ERYTHROCYTE [DISTWIDTH] IN BLOOD BY AUTOMATED COUNT: 13.6 % (ref 11.5–14.5)
GLUCOSE SERPL-MCNC: 93 MG/DL (ref 74–99)
HCT VFR BLD AUTO: 41.2 % (ref 41–52)
HDLC SERPL-MCNC: 38.6 MG/DL
HGB BLD-MCNC: 12.9 G/DL (ref 13.5–17.5)
IMM GRANULOCYTES # BLD AUTO: 0.03 X10*3/UL (ref 0–0.5)
IMM GRANULOCYTES NFR BLD AUTO: 0.4 % (ref 0–0.9)
LDLC SERPL CALC-MCNC: 102 MG/DL
LYMPHOCYTES # BLD AUTO: 1.93 X10*3/UL (ref 0.8–3)
LYMPHOCYTES NFR BLD AUTO: 22.9 %
MCH RBC QN AUTO: 29.5 PG (ref 26–34)
MCHC RBC AUTO-ENTMCNC: 31.3 G/DL (ref 32–36)
MCV RBC AUTO: 94 FL (ref 80–100)
MONOCYTES # BLD AUTO: 0.69 X10*3/UL (ref 0.05–0.8)
MONOCYTES NFR BLD AUTO: 8.2 %
NEUTROPHILS # BLD AUTO: 5.45 X10*3/UL (ref 1.6–5.5)
NEUTROPHILS NFR BLD AUTO: 64.7 %
NON HDL CHOLESTEROL: 142 MG/DL (ref 0–149)
NRBC BLD-RTO: 0 /100 WBCS (ref 0–0)
PLATELET # BLD AUTO: 326 X10*3/UL (ref 150–450)
POTASSIUM SERPL-SCNC: 4.5 MMOL/L (ref 3.5–5.3)
PROT SERPL-MCNC: 6.2 G/DL (ref 6.4–8.2)
RBC # BLD AUTO: 4.38 X10*6/UL (ref 4.5–5.9)
SODIUM SERPL-SCNC: 137 MMOL/L (ref 136–145)
TRIGL SERPL-MCNC: 203 MG/DL (ref 0–149)
VLDL: 41 MG/DL (ref 0–40)
WBC # BLD AUTO: 8.4 X10*3/UL (ref 4.4–11.3)

## 2024-09-23 PROCEDURE — 85025 COMPLETE CBC W/AUTO DIFF WBC: CPT

## 2024-09-23 PROCEDURE — 80061 LIPID PANEL: CPT

## 2024-09-23 PROCEDURE — 80053 COMPREHEN METABOLIC PANEL: CPT

## 2024-09-23 PROCEDURE — 83036 HEMOGLOBIN GLYCOSYLATED A1C: CPT

## 2024-09-23 PROCEDURE — 36415 COLL VENOUS BLD VENIPUNCTURE: CPT

## 2024-09-24 ENCOUNTER — LAB (OUTPATIENT)
Dept: LAB | Facility: LAB | Age: 79
End: 2024-09-24
Payer: MEDICARE

## 2024-09-24 DIAGNOSIS — E78.5 TYPE 2 DIABETES MELLITUS WITH HYPERLIPIDEMIA (MULTI): ICD-10-CM

## 2024-09-24 DIAGNOSIS — E11.69 TYPE 2 DIABETES MELLITUS WITH HYPERLIPIDEMIA (MULTI): ICD-10-CM

## 2024-09-24 LAB
APPEARANCE UR: CLEAR
BILIRUB UR STRIP.AUTO-MCNC: NEGATIVE MG/DL
COLOR UR: YELLOW
EST. AVERAGE GLUCOSE BLD GHB EST-MCNC: 143 MG/DL
GLUCOSE UR STRIP.AUTO-MCNC: NORMAL MG/DL
HBA1C MFR BLD: 6.6 %
KETONES UR STRIP.AUTO-MCNC: NEGATIVE MG/DL
LEUKOCYTE ESTERASE UR QL STRIP.AUTO: NEGATIVE
NITRITE UR QL STRIP.AUTO: NEGATIVE
PH UR STRIP.AUTO: 5.5 [PH]
PROT UR STRIP.AUTO-MCNC: NEGATIVE MG/DL
RBC # UR STRIP.AUTO: NEGATIVE /UL
SP GR UR STRIP.AUTO: 1.02
UROBILINOGEN UR STRIP.AUTO-MCNC: NORMAL MG/DL

## 2024-09-24 PROCEDURE — 81003 URINALYSIS AUTO W/O SCOPE: CPT

## 2024-10-01 ENCOUNTER — TELEPHONE (OUTPATIENT)
Dept: PRIMARY CARE | Facility: CLINIC | Age: 79
End: 2024-10-01

## 2024-10-01 ENCOUNTER — APPOINTMENT (OUTPATIENT)
Dept: PRIMARY CARE | Facility: CLINIC | Age: 79
End: 2024-10-01
Payer: MEDICARE

## 2024-10-01 VITALS
HEART RATE: 73 BPM | OXYGEN SATURATION: 94 % | BODY MASS INDEX: 27.47 KG/M2 | SYSTOLIC BLOOD PRESSURE: 134 MMHG | HEIGHT: 67 IN | TEMPERATURE: 97.8 F | DIASTOLIC BLOOD PRESSURE: 70 MMHG | WEIGHT: 175 LBS

## 2024-10-01 DIAGNOSIS — E11.9 TYPE 2 DIABETES MELLITUS WITHOUT COMPLICATION, WITHOUT LONG-TERM CURRENT USE OF INSULIN (MULTI): ICD-10-CM

## 2024-10-01 DIAGNOSIS — M21.40 PES PLANUS, UNSPECIFIED LATERALITY: ICD-10-CM

## 2024-10-01 DIAGNOSIS — D64.9 ANEMIA, UNSPECIFIED TYPE: ICD-10-CM

## 2024-10-01 DIAGNOSIS — R41.3 MEMORY LOSS: ICD-10-CM

## 2024-10-01 DIAGNOSIS — E78.5 HYPERLIPIDEMIA, UNSPECIFIED HYPERLIPIDEMIA TYPE: ICD-10-CM

## 2024-10-01 DIAGNOSIS — N40.0 BENIGN PROSTATIC HYPERPLASIA WITHOUT LOWER URINARY TRACT SYMPTOMS: ICD-10-CM

## 2024-10-01 DIAGNOSIS — Z00.00 WELL ADULT EXAM: ICD-10-CM

## 2024-10-01 DIAGNOSIS — I10 BENIGN ESSENTIAL HYPERTENSION: ICD-10-CM

## 2024-10-01 DIAGNOSIS — E11.9 TYPE 2 DIABETES MELLITUS WITHOUT COMPLICATION, WITHOUT LONG-TERM CURRENT USE OF INSULIN (MULTI): Primary | ICD-10-CM

## 2024-10-01 DIAGNOSIS — M25.512 ACUTE PAIN OF LEFT SHOULDER: ICD-10-CM

## 2024-10-01 PROCEDURE — 1036F TOBACCO NON-USER: CPT | Performed by: FAMILY MEDICINE

## 2024-10-01 PROCEDURE — 1125F AMNT PAIN NOTED PAIN PRSNT: CPT | Performed by: FAMILY MEDICINE

## 2024-10-01 PROCEDURE — 1158F ADVNC CARE PLAN TLK DOCD: CPT | Performed by: FAMILY MEDICINE

## 2024-10-01 PROCEDURE — G0439 PPPS, SUBSEQ VISIT: HCPCS | Performed by: FAMILY MEDICINE

## 2024-10-01 PROCEDURE — 1170F FXNL STATUS ASSESSED: CPT | Performed by: FAMILY MEDICINE

## 2024-10-01 PROCEDURE — 1123F ACP DISCUSS/DSCN MKR DOCD: CPT | Performed by: FAMILY MEDICINE

## 2024-10-01 PROCEDURE — 1157F ADVNC CARE PLAN IN RCRD: CPT | Performed by: FAMILY MEDICINE

## 2024-10-01 PROCEDURE — 1159F MED LIST DOCD IN RCRD: CPT | Performed by: FAMILY MEDICINE

## 2024-10-01 PROCEDURE — 3075F SYST BP GE 130 - 139MM HG: CPT | Performed by: FAMILY MEDICINE

## 2024-10-01 PROCEDURE — 3078F DIAST BP <80 MM HG: CPT | Performed by: FAMILY MEDICINE

## 2024-10-01 RX ORDER — DONEPEZIL HYDROCHLORIDE 10 MG/1
1 TABLET, FILM COATED ORAL
COMMUNITY
Start: 2024-09-17

## 2024-10-01 ASSESSMENT — PATIENT HEALTH QUESTIONNAIRE - PHQ9
1. LITTLE INTEREST OR PLEASURE IN DOING THINGS: NOT AT ALL
2. FEELING DOWN, DEPRESSED OR HOPELESS: NOT AT ALL
SUM OF ALL RESPONSES TO PHQ9 QUESTIONS 1 AND 2: 0

## 2024-10-01 ASSESSMENT — PAIN SCALES - GENERAL: PAINLEVEL: 3

## 2024-10-01 ASSESSMENT — ACTIVITIES OF DAILY LIVING (ADL)
DRESSING: INDEPENDENT
BATHING: INDEPENDENT
GROCERY_SHOPPING: INDEPENDENT
TAKING_MEDICATION: INDEPENDENT
MANAGING_FINANCES: INDEPENDENT
DOING_HOUSEWORK: INDEPENDENT

## 2024-10-01 ASSESSMENT — ENCOUNTER SYMPTOMS
LOSS OF SENSATION IN FEET: 0
DEPRESSION: 0
OCCASIONAL FEELINGS OF UNSTEADINESS: 0

## 2024-10-01 NOTE — PROGRESS NOTES
"Subjective   Patient ID: Daryl Ferrara is a 79 y.o. male who presents for Medicare Annual Wellness Visit Subsequent (EKG  9/2023/Colonoscopy  2/2027/Zoster x 2 UTD/Pneumonia vaccine x 2 UTD/Flu vaccine done at pharmacy [facility]  9/3/2024) and Shoulder Pain (X 6 weeks, no injury- constant pain  left shoulder).    [facility] DARYL is seen for for his med check. PMH, PSH, family history and social history were reviewed and updated.   Consultants:  1. Dr. Lindo for prostate problems.                      2. Dr. Pressley for memory loss.  sleep apnea was found. on CPAP at [facility]                     3. Dr Lawson, metatarsalgia                     4. colonoscopy nl 2/2017   DARYL is seen also has NIDDM.  years. He is tolerating his medications. DARYL has not had diabetic complications. DARYL is compliant with his medications. DARYL is compliant with doing his fingersticks.   HgbA1c is 6.4.  He is on metformin 1000 milligrams twice a day.  Also on pioglitazone 30 mg qd.  He wears glasses, sees eye doctor at least yearly, eye exam done per specialist.  Seeing podiatry for foot pain   DARYL is here also for hypertension. It was diagnosed several year ago.  has had intermittent BP elevations over the years , on losartan 50 mg qd.   DARYL is seen today also has Hypercholesterolemia.  could not tolerate Lipitor or pravastatin, Myalgias. now tolerating pravastatin M,W,F DARYL has the following cardiac risk factors: coronary artery disease and diabetes .  coronary CA score positive in 2017,  Nuc stress test neg for ischemia.  On pravastatin 20 mg qd.   DARYL is seen today also has anemia.  incidental finding on CPE labs 3/2018 The anemia is due to undetermined.  had nl colonoscopy 2017.    EGD done 12/2018.  Pathology showed evidence of \"mild chronic gastritis.  Done per Dr. Schultz.   was told to take iron daily, he's not sure who recommended. possibly from pulmonary, was told he has restless legs on sleep study.  H/H has been " stable , RBC indices normal, RDW normal.  iron levels and B12 nl.  Taking OTC B12 supplement.  Also was   taking meloxicam QOD    Vitamin-D.  . He takes  vitamin D specifically now, followup level 30.  Pt has BPH, on tamsulosin 0.4 mg qd. Sees urology.  Pt has incontinence, on Myrbetriq 50 mg qd.  Sees Urology.  Patient has a history of basal cell carcinoma of the left thigh.  It has been excised.  Pt has dementia, mild.  On memantine 5 mg qd.  Sees Neurology Patient is recently found a  with whom he is spending quite a bit of time.  Patient's most recent tetanus shot is recorded as 2011.  Patient did complete the shingles immunization series x2.  He has been immunized against pneumococcal disease.  He has been immunized against coronavirus times 6.  He will be receiving a flu shot today.  Patient had a colonoscopy in 2017.  He does not require further testing.  Patient does not use tobacco or alcohol.    Review of Systems  Eyes:  He is negative for loss and blurring of vision, eye pain.   Cardiovascular:  He is negative for chest pain/pressure, rapid heart beats, orthopnea, edema.   Respiratory:  He is positive for dyspnea on exertion. He is negative for shortness of breath, coughing, sputum, tobacco use.   Gastrointestinal:  He is positive  for indigestion,  bloating , abdominal pain,    No melena  Male Genitourinary:  He is positive for frequency, nocturia, Slowing of urinary stream. He is negative for dysuria, hematuria,     Musculoskeletal:  He is positive for joint pain, myalgias, stiffness   Neurological:  He is positive for memory loss. He is negative for numbness, tingling, weakness, balance problems.   Endocrine:  He is negative for weight gain, polyuria, polydipsia.   Allergic/Immunologic:  He is positive for environmental triggers, seasonal symptoms. He is negative for eczema  Objective   /70 (BP Location: Left arm, Patient Position: Sitting, BP Cuff Size: Adult)   Pulse 73   Temp 36.6  "°C (97.8 °F)   Ht 1.702 m (5' 7\")   Wt 79.4 kg (175 lb)   SpO2 94%   BMI 27.41 kg/m²     Physical Exam  General Appearance: ERICKA is comfortable. and appears does not appear acutely ill.  Ears, Nose, Mouth, Throat: Posterior pharynx reveals no abnormalities.  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 stable  Neck: Neck reveals supple, no adenopathy, no thyromegaly, or carotid bruits.  Chest: Lungs are clear to auscultation bilaterally with no wheezes, rales, or rhonchi.  Cardiovascular: RRR without MRG.  No edema in bilateral lower extremity. Pedal pulses intact  Abdomen: A hernia is palpable in the left inguinal canal , small, and reduces spontaneously.  Genitourinary: Genital/Rectal exam is deferred because done by urologist.  Musculoskeletal:   Right Foot - Inspection reveals DJD 1st MP joint, and Inspection of the toes reveals no abnormalities .  no significant callous   Left Foot - Inspection reveals  abnormalities , With arch flattening and question early Charcot foot.   Inspection of the toes reveals no abnormalities .  no significant callous  Skin:   Has AE 1.2 centimeter lesion on the top of his left thigh that has hyperpigmented speckling inside of it.Skin reveals no suspicious lesions  Neurological: Light touch is normal. Vibratory sensation is intact.  Psychiatric: Patient has appropriate judgement. Patient has good insight. Patient's mood is appropriate  Assessment/Plan   Problem List Items Addressed This Visit             ICD-10-CM    Benign essential hypertension chronic, stable. I10    Anemia   chronic.  Will recheck CBC in about 6 months D64.9    Relevant Orders    Follow Up In Primary Care - Established    Benign prostatic hyperplasia   chronic. N40.0    Memory loss chronic. R41.3    Pes planus chronic. M21.40     Other Visit Diagnoses         Codes    Type 2 diabetes mellitus without complication, without long-term current use of insulin (Multi)    -  " Primary chronic, stable.  Patient will follow-up in about 6 months. E11.9    Relevant Orders    Follow Up In Primary Care - Established    Hyperlipidemia, unspecified hyperlipidemia type chronic. E78.5    Well adult exam    normal exam with exceptions as noted Z00.00    Acute pain of left shoulder    needs workup.  Patient will get an x-ray of the left shoulder and will be referred to orthopedics. M25.512    Relevant Orders    XR shoulder left 2+ views

## 2024-10-07 DIAGNOSIS — L30.9 DERMATITIS: ICD-10-CM

## 2024-10-07 RX ORDER — CLOBETASOL PROPIONATE 0.5 MG/G
1 CREAM TOPICAL 2 TIMES DAILY
Qty: 60 G | Refills: 3 | Status: SHIPPED | OUTPATIENT
Start: 2024-10-07

## 2024-10-17 ENCOUNTER — OFFICE VISIT (OUTPATIENT)
Dept: SLEEP MEDICINE | Facility: CLINIC | Age: 79
End: 2024-10-17
Payer: MEDICARE

## 2024-10-17 VITALS
WEIGHT: 172 LBS | SYSTOLIC BLOOD PRESSURE: 138 MMHG | DIASTOLIC BLOOD PRESSURE: 62 MMHG | BODY MASS INDEX: 27 KG/M2 | HEIGHT: 67 IN | HEART RATE: 86 BPM | OXYGEN SATURATION: 98 %

## 2024-10-17 DIAGNOSIS — D50.9 IRON DEFICIENCY ANEMIA, UNSPECIFIED IRON DEFICIENCY ANEMIA TYPE: ICD-10-CM

## 2024-10-17 DIAGNOSIS — G47.61 PERIODIC LIMB MOVEMENT DISORDER: ICD-10-CM

## 2024-10-17 DIAGNOSIS — G47.33 OBSTRUCTIVE SLEEP APNEA, ADULT: Primary | ICD-10-CM

## 2024-10-17 DIAGNOSIS — Z45.42 ENCOUNTER FOR ADJUSTMENT AND MANAGEMENT OF NEUROSTIMULATOR: ICD-10-CM

## 2024-10-17 DIAGNOSIS — M19.011 OSTEOARTHRITIS OF RIGHT SHOULDER, UNSPECIFIED OSTEOARTHRITIS TYPE: ICD-10-CM

## 2024-10-17 PROCEDURE — 1157F ADVNC CARE PLAN IN RCRD: CPT | Performed by: INTERNAL MEDICINE

## 2024-10-17 PROCEDURE — 1126F AMNT PAIN NOTED NONE PRSNT: CPT | Performed by: INTERNAL MEDICINE

## 2024-10-17 PROCEDURE — 1160F RVW MEDS BY RX/DR IN RCRD: CPT | Performed by: INTERNAL MEDICINE

## 2024-10-17 PROCEDURE — 1123F ACP DISCUSS/DSCN MKR DOCD: CPT | Performed by: INTERNAL MEDICINE

## 2024-10-17 PROCEDURE — 1159F MED LIST DOCD IN RCRD: CPT | Performed by: INTERNAL MEDICINE

## 2024-10-17 PROCEDURE — 99214 OFFICE O/P EST MOD 30 MIN: CPT | Performed by: INTERNAL MEDICINE

## 2024-10-17 PROCEDURE — 1036F TOBACCO NON-USER: CPT | Performed by: INTERNAL MEDICINE

## 2024-10-17 PROCEDURE — 3075F SYST BP GE 130 - 139MM HG: CPT | Performed by: INTERNAL MEDICINE

## 2024-10-17 PROCEDURE — 3078F DIAST BP <80 MM HG: CPT | Performed by: INTERNAL MEDICINE

## 2024-10-17 PROCEDURE — 95970 ALYS NPGT W/O PRGRMG: CPT | Performed by: INTERNAL MEDICINE

## 2024-10-17 ASSESSMENT — SLEEP AND FATIGUE QUESTIONNAIRES
HOW LIKELY ARE YOU TO NOD OFF OR FALL ASLEEP WHEN YOU ARE A PASSENGER IN A CAR FOR AN HOUR WITHOUT A BREAK: WOULD NEVER DOZE
HOW LIKELY ARE YOU TO NOD OFF OR FALL ASLEEP WHILE LYING DOWN TO REST IN THE AFTERNOON WHEN CIRCUMSTANCES PERMIT: SLIGHT CHANCE OF DOZING
SITING INACTIVE IN A PUBLIC PLACE LIKE A CLASS ROOM OR A MOVIE THEATER: WOULD NEVER DOZE
HOW LIKELY ARE YOU TO NOD OFF OR FALL ASLEEP IN A CAR, WHILE STOPPED FOR A FEW MINUTES IN TRAFFIC: WOULD NEVER DOZE
HOW LIKELY ARE YOU TO NOD OFF OR FALL ASLEEP WHILE WATCHING TV: MODERATE CHANCE OF DOZING
ESS-CHAD TOTAL SCORE: 5
HOW LIKELY ARE YOU TO NOD OFF OR FALL ASLEEP WHILE SITTING AND READING: MODERATE CHANCE OF DOZING
HOW LIKELY ARE YOU TO NOD OFF OR FALL ASLEEP WHILE SITTING QUIETLY AFTER LUNCH WITHOUT ALCOHOL: WOULD NEVER DOZE
HOW LIKELY ARE YOU TO NOD OFF OR FALL ASLEEP WHILE SITTING AND TALKING TO SOMEONE: WOULD NEVER DOZE

## 2024-10-17 ASSESSMENT — PAIN SCALES - GENERAL: PAINLEVEL_OUTOF10: 0-NO PAIN

## 2024-10-17 NOTE — ASSESSMENT & PLAN NOTE
Outgoing settings:     BIPOLAR CONFIGURATION (DEFAULT) : (+, -, +)    Outgoing amplitude 1.7 volts. Range adjusted to ( 1.5 - 1.7 volts) . Therapeutic amplitude 1.7 volts. Rate 33 Hz. Pulse Width 90 µs    Start delay 30   minutes. Pause/delay 20  minutes . Duration of therapy 8 hours .    The waveform was evaluated. An adequate rise and fall was observed.

## 2024-10-17 NOTE — ASSESSMENT & PLAN NOTE
"- Inspire device was interrogated at today's visit.  -  Waveform was assessed with good rise and fall. Adjustments made as noted in HPI today.   - Reviewed and educated Daryl Ferrara  on proper sleep remote function.    - Daryl Ferrara demonstrated competency with the remote,   - Instructed the patient to use therapy \"all night, every night\".  - He is doing well and tolerating therapy   "

## 2024-10-17 NOTE — ASSESSMENT & PLAN NOTE
Most recent ferritin in August was 67.  He will need to continue oral iron therapy.  Will reevaluate in 6 months

## 2024-10-17 NOTE — ASSESSMENT & PLAN NOTE
His shoulder pain is disrupting his sleep.  He wants to get an MRI.  Reports prior rotator cuff tear.  I stated he should follow-up with his PCP who may be able to address this with physical therapy.  If he needs an MRI he is able to get 1 he was educated on how to contact inspire for support if he needs an MRI.  Daryl verbalized understanding

## 2024-10-17 NOTE — PROGRESS NOTES
Subjective   Patient ID: Daryl Ferrara is a 79 y.o. male who presents for Sleep Apnea and Inspire - Followup.  HPI    Prior sleep history:  04/20/2023: DARYL presents for routine visit to monitor response to Inspire therapy. The patient continues to report significant benefit from the device. DARYL denies any significant side effects or intolerance of therapy.  He reports that he is getting a tingling sensation in his neck and right shoulder any time of the day. He naps in a recliner occasionally. He is currently using 1.6v and does not believe that he can increase therapy. He believes that he may be more rested in the morning, but he believes that his medications are causing him to feel tired. The waveform was checked with adequate rise/fall. Tongue motion was assessed and deemed appropriate.  Implant date 10/20/2021  Prior Sleep studies: Fine tune study 3/11/22, therapeutic amplitude 1.7 v on default (+, -, +). Incoming amplitude (range) :1.6 v ( 1.6- 1.9v). Usage 40 hours per week. Start delay 30 minutes . Pause/delay 20 minutes . Duration of therapy 8 hours .   He had a therapeutic amplitude of 1.7 v during his prior fine tune titration,  07/20/2023 : Nights used > 4 hours: 78%. Hours used per night:5.1. Pauses per night: 1.4. Amplitude where the patient utilized therapy the most 1.7 volts. Functional threshold on 07/20/2023 1.2 volts     PERIODIC LIMB MOVEMENTS with a PMI 58 on sleep testing. He does have low ferritin low iron stores, denies RLS symptoms. He was prescribed oral iron but stopped due to constipation.    BALANCE ISSUES he is scheduled for an MRI through neurology  1/18/2024: Office Visit: Dr. Ko Pearson: Neurology diagnosed him with neuropathy and flatfoot, undergoing PT.  Order to recheck iron levels for therapeutic amplitude inspire therapy    7/18/2024: Office Visit: Dr. Ko Pearson:  Current sleep history:    Inspire appointment to evaluate Daryl Ferrara 's Sleep  "Apnea and Inspire - Followup    and response to therapy.     Daryl denies  issues with his Inspire therapy. He is having issues with his shoulder had prior rotator cuff tear and is asking about an MRI    Inspire remote interrogation:   The Inspire cloud was available for review.    Inspire Remote interrogation via USB:       INCOMING SETTINGS:   BIPOLAR CONFIGURATION (DEFAULT) : (+, -, +)    Incoming Amplitude 1.7 volts. Incoming Range ( 1.5 - 1.7 v). Rate 33 Hz. Pulse Width 90 µs.     Start delay 30   minutes. Pause/delay 20  minutes . Duration of therapy 8 hours.       DEVICE INTERROGATION:  Average usage hours per week: 30 hours determined from device interrogation  Electrode configurations tested:    BIPOLAR CONFIGURATION (DEFAULT) : (+, -, +)    CHANGES MADE AT TODAYS VISIT:   Outgoing settings:     BIPOLAR CONFIGURATION (DEFAULT) : (+, -, +)    Outgoing amplitude 1.7 volts. Range adjusted to ( 1.5 - 1.7 volts) . Therapeutic amplitude 1.7 volts. Rate 33 Hz. Pulse Width 90 µs    Start delay 30   minutes. Pause/delay 20  minutes . Duration of therapy 8 hours .    The waveform was evaluated. An adequate rise and fall was observed.    Review of Systems  Review of systems negative except as per HPI   [place] Sleepiness Scale: 5     Objective   /62   Pulse 86   Ht 1.702 m (5' 7\")   Wt 78 kg (172 lb)   SpO2 98%   BMI 26.94 kg/m²    PREVIOUS WEIGHTS:  Wt Readings from Last 3 Encounters:   10/17/24 78 kg (172 lb)   10/01/24 79.4 kg (175 lb)   08/23/24 78 kg (172 lb)     Physical Exam  PHYSICAL EXAM: GENERAL: alert pleasant and cooperative no acute distress  PSYCH EXAM: alert,oriented, in NAD with a full range of affect, normal behavior and no psychotic features   Tongue motion was assessed:  tongue protrusion was midline, no fasciculations noted  Tongue protrusion with stimulation was  tongued protruded to the left without fasciculations    Assessment/Plan   Problem List Items Addressed This Visit            " " ICD-10-CM    Anemia D64.9     Continue oral iron therapy levels have been improving         Obstructive sleep apnea, adult - Primary G47.33     - Inspire device was interrogated at today's visit.  -  Waveform was assessed with good rise and fall. Adjustments made as noted in HPI today.   - Reviewed and educated Daryl Ferrara  on proper sleep remote function.    - Daryl Ferrara demonstrated competency with the remote,   - Instructed the patient to use therapy \"all night, every night\".  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  He wants to get an MRI.  Reports prior rotator cuff tear.  I stated he should follow-up with his PCP who may be able to address this with physical therapy.  If he needs an MRI he is able to get 1 he was educated on how to contact inspire for support if he needs an MRI.  Daryl verbalized understanding         Encounter for adjustment and management of neurostimulator Z45.42     Outgoing settings:     BIPOLAR CONFIGURATION (DEFAULT) : (+, -, +)    Outgoing amplitude 1.7 volts. Range adjusted to ( 1.5 - 1.7 volts) . Therapeutic amplitude 1.7 volts. Rate 33 Hz. Pulse Width 90 µs    Start delay 30   minutes. Pause/delay 20  minutes . Duration of therapy 8 hours .    The waveform was evaluated. An adequate rise and fall was observed.         Periodic limb movement disorder G47.61     Most recent ferritin in August was 67.  He will need to continue oral iron therapy.  Will reevaluate in 6 months             "

## 2024-12-06 DIAGNOSIS — N40.0 BENIGN PROSTATIC HYPERPLASIA WITHOUT LOWER URINARY TRACT SYMPTOMS: ICD-10-CM

## 2024-12-06 DIAGNOSIS — D50.9 IRON DEFICIENCY ANEMIA, UNSPECIFIED IRON DEFICIENCY ANEMIA TYPE: ICD-10-CM

## 2024-12-06 DIAGNOSIS — E78.5 TYPE 2 DIABETES MELLITUS WITH HYPERLIPIDEMIA (MULTI): ICD-10-CM

## 2024-12-06 DIAGNOSIS — E11.69 TYPE 2 DIABETES MELLITUS WITH HYPERLIPIDEMIA (MULTI): ICD-10-CM

## 2024-12-06 DIAGNOSIS — N40.0 BENIGN PROSTATIC HYPERPLASIA, UNSPECIFIED WHETHER LOWER URINARY TRACT SYMPTOMS PRESENT: ICD-10-CM

## 2024-12-06 RX ORDER — PIOGLITAZONEHYDROCHLORIDE 30 MG/1
30 TABLET ORAL DAILY
Qty: 90 TABLET | Refills: 3 | Status: SHIPPED | OUTPATIENT
Start: 2024-12-06

## 2024-12-06 RX ORDER — TAMSULOSIN HYDROCHLORIDE 0.4 MG/1
0.4 CAPSULE ORAL DAILY
Qty: 90 CAPSULE | Refills: 3 | Status: SHIPPED | OUTPATIENT
Start: 2024-12-06

## 2024-12-06 RX ORDER — FINASTERIDE 5 MG/1
5 TABLET, FILM COATED ORAL DAILY
Qty: 90 TABLET | Refills: 3 | Status: SHIPPED | OUTPATIENT
Start: 2024-12-06

## 2024-12-09 RX ORDER — FERROUS SULFATE 325(65) MG
TABLET ORAL
Qty: 90 TABLET | Refills: 1 | Status: SHIPPED | OUTPATIENT
Start: 2024-12-09

## 2024-12-13 ENCOUNTER — APPOINTMENT (OUTPATIENT)
Dept: RADIOLOGY | Facility: HOSPITAL | Age: 79
End: 2024-12-13
Payer: MEDICARE

## 2024-12-13 ENCOUNTER — HOSPITAL ENCOUNTER (EMERGENCY)
Facility: HOSPITAL | Age: 79
Discharge: HOME | End: 2024-12-13
Payer: MEDICARE

## 2024-12-13 VITALS
DIASTOLIC BLOOD PRESSURE: 74 MMHG | BODY MASS INDEX: 27 KG/M2 | OXYGEN SATURATION: 98 % | HEART RATE: 71 BPM | HEIGHT: 67 IN | SYSTOLIC BLOOD PRESSURE: 166 MMHG | RESPIRATION RATE: 17 BRPM | TEMPERATURE: 98.6 F | WEIGHT: 172 LBS
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
## 2024-12-13 DIAGNOSIS — M25.552 LEFT HIP PAIN: Primary | ICD-10-CM

## 2024-12-13 PROCEDURE — 73552 X-RAY EXAM OF FEMUR 2/>: CPT | Mod: LT

## 2024-12-13 PROCEDURE — 2500000001 HC RX 250 WO HCPCS SELF ADMINISTERED DRUGS (ALT 637 FOR MEDICARE OP)

## 2024-12-13 PROCEDURE — 99283 EMERGENCY DEPT VISIT LOW MDM: CPT

## 2024-12-13 PROCEDURE — 73552 X-RAY EXAM OF FEMUR 2/>: CPT | Mod: LEFT SIDE | Performed by: RADIOLOGY

## 2024-12-13 RX ORDER — TRAMADOL HYDROCHLORIDE 50 MG/1
50 TABLET ORAL ONCE
Status: COMPLETED | OUTPATIENT
Start: 2024-12-13 | End: 2024-12-13

## 2024-12-13 RX ORDER — TRAMADOL HYDROCHLORIDE 50 MG/1
50 TABLET ORAL EVERY 6 HOURS PRN
Qty: 10 TABLET | Refills: 0 | Status: SHIPPED | OUTPATIENT
Start: 2024-12-13 | End: 2024-12-16

## 2024-12-13 ASSESSMENT — PAIN DESCRIPTION - DESCRIPTORS: DESCRIPTORS: TENDER

## 2024-12-13 ASSESSMENT — PAIN DESCRIPTION - FREQUENCY: FREQUENCY: INTERMITTENT

## 2024-12-13 ASSESSMENT — COLUMBIA-SUICIDE SEVERITY RATING SCALE - C-SSRS
1. IN THE PAST MONTH, HAVE YOU WISHED YOU WERE DEAD OR WISHED YOU COULD GO TO SLEEP AND NOT WAKE UP?: NO
2. HAVE YOU ACTUALLY HAD ANY THOUGHTS OF KILLING YOURSELF?: NO
6. HAVE YOU EVER DONE ANYTHING, STARTED TO DO ANYTHING, OR PREPARED TO DO ANYTHING TO END YOUR LIFE?: NO

## 2024-12-13 ASSESSMENT — PAIN SCALES - GENERAL: PAINLEVEL_OUTOF10: 10 - WORST POSSIBLE PAIN

## 2024-12-13 ASSESSMENT — PAIN DESCRIPTION - LOCATION: LOCATION: HIP

## 2024-12-13 ASSESSMENT — PAIN DESCRIPTION - ORIENTATION: ORIENTATION: LEFT

## 2024-12-13 ASSESSMENT — PAIN - FUNCTIONAL ASSESSMENT: PAIN_FUNCTIONAL_ASSESSMENT: 0-10

## 2024-12-13 NOTE — ED PROVIDER NOTES
HPI   Chief Complaint   Patient presents with    Fall     Pt had slipped on ice around 2;30 today. Pt had landed on left hip, a light garbage can had fallen ontop of him. Pt does not take any blood thinner, denies loc or hitting his head. Pt is alert and oriented X4. Pt reports left hip pain that worsens upon standing/ walking. States pain feels like he had bruised his tailbone.       Patient is an 79-year-old male presents emergency department for evaluation of fall with left hip injury.  Patient states that today he was taking up garbage cans when he slipped on ice and fell onto his left hip and leg.  Patient states he was able to get up off the ground, but states has been having continued pain in his left hip and thigh.  He states that he not hit his head or lose any consciousness.  He Nuys any pain to the right hip or lower extremity.  He denies any ankle or foot pain.  He denies any head injury or loss of consciousness.  He has been ambulating, but states that the pain is significantly worse when bearing weight on the left leg.  He states he feels a good tightness in the front of his left thigh muscle as well.  He does take Tylenol every day due to his arthritis.  He denies any neck or back pain at this time.  He states he landed on his tailbone, but states that his tailbone has been significantly improving and not having significant pain there at this time.  He denies any lower extremity numbness, tingling, bowel or bladder incontinence, saddle anesthesia, weakness.      History provided by:  Patient   used: No            Patient History   Past Medical History:   Diagnosis Date    Abdominal bloating 09/13/2023    Acute bronchitis 06/05/2023    Cough 06/05/2023    Fall 06/05/2023    Fall at home 06/05/2023    Hemangioma of skin and subcutaneous tissue 03/27/2023    Left knee pain 02/13/2020    Lower abdominal pain 11/08/2018    Lower urinary tract symptoms 08/01/2004    Formatting of this  note might be different from the original. On max alpha-blockers since .  Proscar added 10/06 (Baseline PSA 1.9).  Oxybutynin ER 10mg added 1/10.    Other symbolic dysfunctions     Anomia    Periodic limb movement disorder     Periodic limb movement    Personal history of other diseases of the musculoskeletal system and connective tissue     History of low back pain    Personal history of other diseases of the nervous system and sense organs     History of sleep disturbance    Personal history of other specified conditions     History of memory loss    Personal history of other specified conditions     History of balance disorder    Rash 2021    Tick bite of abdominal wall 2020    Urinary incontinence 2022     Past Surgical History:   Procedure Laterality Date    COLONOSCOPY      OTHER SURGICAL HISTORY  06/15/2021    Hernia repair    OTHER SURGICAL HISTORY  06/15/2021    Carpal tunnel surgery    OTHER SURGICAL HISTORY  2023    EKG     No family history on file.  Social History     Tobacco Use    Smoking status: Former     Types: Cigarettes     Start date:      Quit date:      Years since quittin.9    Smokeless tobacco: Never   Substance Use Topics    Alcohol use: Not Currently    Drug use: Never       Physical Exam   ED Triage Vitals [24 1652]   Temperature Heart Rate Respirations BP   37 °C (98.6 °F) 71 17 166/74      Pulse Ox Temp Source Heart Rate Source Patient Position   98 % Oral Monitor Sitting      BP Location FiO2 (%)     Right arm --       Physical Exam  Constitutional:       Appearance: Normal appearance.   Cardiovascular:      Rate and Rhythm: Normal rate and regular rhythm.   Pulmonary:      Effort: Pulmonary effort is normal.      Breath sounds: Normal breath sounds.   Musculoskeletal:         General: Tenderness and signs of injury present. Normal range of motion.      Comments: Tenderness palpation over anterior mid left femur with range of motion to  left hip intact with pain with flexion and extension.  Tenderness palpation over lateral left hip.  Lower extremities good distal pulses.  Range of motion of right lower extremity intact without complication.   Skin:     General: Skin is warm and dry.   Neurological:      General: No focal deficit present.      Mental Status: He is alert and oriented to person, place, and time.           ED Course & MDM   Diagnoses as of 12/13/24 2042   Left hip pain   Fall, initial encounter                 No data recorded     Severance Coma Scale Score: 15 (12/13/24 1659 : Maikel Corrigan, BENJI)                           Medical Decision Making  Patient is an 79-year-old male presents emergency department for evaluation of left hip and thigh pain.    Lab work warranted at today's visit.    Scans done today were interpreted/confirmed by radiologist and also interpreted by me which included x-ray left femur.  X-ray shows no evidence for acute bony abnormality with degenerative changes noted.    Medications given at today's visit include PO tramadol    I saw this patient independently.  Patient had some improvement in symptoms medication given emergency department.  X-ray showed no evidence for acute bony abnormality.  Patient likely has signs symptoms most consistent with contusion related to fall.  He is able to ambulate in the emergency department with walker without difficulty.  He is educated on continued symptom management and close follow-up outpatient with primary care provider as well as educated on the benefits of physical therapy to help with symptoms.  Given that he is able to ambulate in the emergency department any difficulty with negative x-rays he stable to be discharged to follow-up closely outpatient with primary care provider.  Patient agreeable plan of discharge at this time.  Have a given short course of tramadol to help with more severe pain.  Emergent pathologies were considered for this patient, although I  have low suspicion for anything acutely emergent given patient's clinical presentation, history, physical exam, stable vital signs, and relatively unremarkable workup.  Discharging patient home is reasonable plan of care for outpatient management.    All labs, imaging, and diagnostic studies were reviewed by me and patient was counseled on clinical impression, expectations, and plan.  Patient was educated to follow-up with PCP in the following 1-2 days.  All questions from patient were answered. They elicited understanding and were agreeable to course of treatment.  Patient was discharged in stable condition and given strict return precautions.    Prescriptions given on discharge: PO tramadol    ** Disclaimer:  Parts of this document were written utilizing a voice to text dictation software.  Note may contain minor transcription or typographical errors that were inadvertently transcribed by the computer software.        Procedure  Procedures     Eva Vogel PA-C  12/13/24 2048

## 2024-12-14 NOTE — DISCHARGE INSTRUCTIONS
Follow-up closely with primary care provider in the following week.  Information given for orthopedic follow-up with she continued to have pain.  Continue Tylenol and ibuprofen as needed for aches and pains.  Additionally given tramadol to help with more severe pain.

## 2024-12-19 ENCOUNTER — OFFICE VISIT (OUTPATIENT)
Dept: PRIMARY CARE | Facility: CLINIC | Age: 79
End: 2024-12-19
Payer: MEDICARE

## 2024-12-19 VITALS
DIASTOLIC BLOOD PRESSURE: 64 MMHG | WEIGHT: 169 LBS | HEART RATE: 81 BPM | HEIGHT: 67 IN | BODY MASS INDEX: 26.53 KG/M2 | OXYGEN SATURATION: 93 % | SYSTOLIC BLOOD PRESSURE: 136 MMHG

## 2024-12-19 DIAGNOSIS — M25.552 LEFT HIP PAIN: Primary | ICD-10-CM

## 2024-12-19 PROCEDURE — 1157F ADVNC CARE PLAN IN RCRD: CPT | Performed by: FAMILY MEDICINE

## 2024-12-19 PROCEDURE — 99213 OFFICE O/P EST LOW 20 MIN: CPT | Performed by: FAMILY MEDICINE

## 2024-12-19 PROCEDURE — 1159F MED LIST DOCD IN RCRD: CPT | Performed by: FAMILY MEDICINE

## 2024-12-19 PROCEDURE — 3078F DIAST BP <80 MM HG: CPT | Performed by: FAMILY MEDICINE

## 2024-12-19 PROCEDURE — 1036F TOBACCO NON-USER: CPT | Performed by: FAMILY MEDICINE

## 2024-12-19 PROCEDURE — 1125F AMNT PAIN NOTED PAIN PRSNT: CPT | Performed by: FAMILY MEDICINE

## 2024-12-19 PROCEDURE — 1123F ACP DISCUSS/DSCN MKR DOCD: CPT | Performed by: FAMILY MEDICINE

## 2024-12-19 PROCEDURE — 3075F SYST BP GE 130 - 139MM HG: CPT | Performed by: FAMILY MEDICINE

## 2024-12-19 ASSESSMENT — PATIENT HEALTH QUESTIONNAIRE - PHQ9
2. FEELING DOWN, DEPRESSED OR HOPELESS: NOT AT ALL
1. LITTLE INTEREST OR PLEASURE IN DOING THINGS: NOT AT ALL
SUM OF ALL RESPONSES TO PHQ9 QUESTIONS 1 AND 2: 0

## 2024-12-19 ASSESSMENT — PAIN SCALES - GENERAL: PAINLEVEL_OUTOF10: 9

## 2024-12-19 ASSESSMENT — ENCOUNTER SYMPTOMS
LOSS OF SENSATION IN FEET: 0
DEPRESSION: 0
OCCASIONAL FEELINGS OF UNSTEADINESS: 1

## 2024-12-19 NOTE — PROGRESS NOTES
"Subjective   Patient ID: Daryl Ferrara is a 79 y.o. male who presents for Follow-up (Patient slipped on the ice and fell 12/13/2024.   Went to [facility].  X-rays done of hip) and Leg Pain (Left upper thigh pain that radiates into the groin. Difficult to walk on his own.  Is using walker).    HPI here for left hip pain.  But 6 days ago patient was retreating his garbage cans and slipped and did not ice.  He fell backward onto his left hip.  It was exquisite pain and he had difficulty walking.  He went to the ED where an x-ray was done of the femur and partial view of the pelvis.  It was read as unremarkable.  Patient is using ibuprofen with some relief.  However he is using a walker because he is unable to bear weight on the left hip.    Review of Systems  Constitutional: Patient is negative for fever, fatigue, weight change.  HEENT: Patient is negative for change in vision, hearing, swallow.  Cardio: Patient is negative for chest pain, lower extremity edema.  Pulmonary: Patient is negative for cough, shortness of breath.  Musculoskeletal: Patient with tenderness to the left hip in the area of the groin.  Objective   /64 (BP Location: Left arm, Patient Position: Sitting, BP Cuff Size: Adult)   Pulse 81   Ht 1.702 m (5' 7\")   Wt 76.7 kg (169 lb)   SpO2 93%   BMI 26.47 kg/m²     Physical Exam  General: Awake and alert no apparent distress.  HEENT: Moist oral mucosa no cervical lymphadenopathy.  Cardio: Heart S1-S2 no murmur rub or gallop.  Pulmonary: Lungs clear to auscultation bilaterally.  Musculoskeletal: Left hip without significant pain to deep palpation in the inguinal crease.  Good hip flexion strength.  A little bit of discomfort on extreme inversion of the femur.  Assessment/Plan   Problem List Items Addressed This Visit    None  Visit Diagnoses         Codes    Left hip pain    -  Primary   needs better control.  Patient continues acetaminophen 650 mg twice daily as needed.  He " may also attempt to add ibuprofen 200 mg 2 tablets 3 times daily with food.  If he is no better in 1 week he will contact this office and patient will be referred to orthopedics. M24.313

## 2024-12-27 ENCOUNTER — TELEPHONE (OUTPATIENT)
Dept: PRIMARY CARE | Facility: CLINIC | Age: 79
End: 2024-12-27
Payer: MEDICARE

## 2024-12-27 NOTE — TELEPHONE ENCOUNTER
Patient went to a chiropractor for the fall that you seen him for and he wanted to let you know that his spine is also messed up. He wants you to call him so he can explain what is going on and wandering if he should still take the Ibuprofen?

## 2025-01-02 NOTE — TELEPHONE ENCOUNTER
Patient called on 12/27/24 and has not got a response yet. He slipped on the ice, he says FRANCISCO knows about this. Has seen the chiropractor who has XRAY of his spine and wants to go over this with FRANCISCO.

## 2025-01-16 ENCOUNTER — APPOINTMENT (OUTPATIENT)
Dept: SLEEP MEDICINE | Facility: CLINIC | Age: 80
End: 2025-01-16
Payer: MEDICARE

## 2025-02-06 ENCOUNTER — APPOINTMENT (OUTPATIENT)
Dept: SLEEP MEDICINE | Facility: CLINIC | Age: 80
End: 2025-02-06
Payer: MEDICARE

## 2025-02-06 VITALS
SYSTOLIC BLOOD PRESSURE: 150 MMHG | HEART RATE: 76 BPM | DIASTOLIC BLOOD PRESSURE: 68 MMHG | BODY MASS INDEX: 26.68 KG/M2 | HEIGHT: 67 IN | OXYGEN SATURATION: 98 % | WEIGHT: 170 LBS

## 2025-02-06 DIAGNOSIS — Z45.42 ENCOUNTER FOR ADJUSTMENT AND MANAGEMENT OF NEUROSTIMULATOR: ICD-10-CM

## 2025-02-06 DIAGNOSIS — G47.33 OBSTRUCTIVE SLEEP APNEA, ADULT: Primary | ICD-10-CM

## 2025-02-06 PROCEDURE — 1126F AMNT PAIN NOTED NONE PRSNT: CPT | Performed by: INTERNAL MEDICINE

## 2025-02-06 PROCEDURE — 99214 OFFICE O/P EST MOD 30 MIN: CPT | Performed by: INTERNAL MEDICINE

## 2025-02-06 PROCEDURE — 1036F TOBACCO NON-USER: CPT | Performed by: INTERNAL MEDICINE

## 2025-02-06 PROCEDURE — 3078F DIAST BP <80 MM HG: CPT | Performed by: INTERNAL MEDICINE

## 2025-02-06 PROCEDURE — 1157F ADVNC CARE PLAN IN RCRD: CPT | Performed by: INTERNAL MEDICINE

## 2025-02-06 PROCEDURE — 95977 ALYS CPLX CN NPGT PRGRMG: CPT | Performed by: INTERNAL MEDICINE

## 2025-02-06 PROCEDURE — 1159F MED LIST DOCD IN RCRD: CPT | Performed by: INTERNAL MEDICINE

## 2025-02-06 PROCEDURE — 1123F ACP DISCUSS/DSCN MKR DOCD: CPT | Performed by: INTERNAL MEDICINE

## 2025-02-06 PROCEDURE — 3077F SYST BP >= 140 MM HG: CPT | Performed by: INTERNAL MEDICINE

## 2025-02-06 ASSESSMENT — SLEEP AND FATIGUE QUESTIONNAIRES
HOW LIKELY ARE YOU TO NOD OFF OR FALL ASLEEP IN A CAR, WHILE STOPPED FOR A FEW MINUTES IN TRAFFIC: WOULD NEVER DOZE
HOW LIKELY ARE YOU TO NOD OFF OR FALL ASLEEP WHILE WATCHING TV: SLIGHT CHANCE OF DOZING
HOW LIKELY ARE YOU TO NOD OFF OR FALL ASLEEP WHEN YOU ARE A PASSENGER IN A CAR FOR AN HOUR WITHOUT A BREAK: WOULD NEVER DOZE
HOW LIKELY ARE YOU TO NOD OFF OR FALL ASLEEP WHILE SITTING AND TALKING TO SOMEONE: WOULD NEVER DOZE
SITING INACTIVE IN A PUBLIC PLACE LIKE A CLASS ROOM OR A MOVIE THEATER: WOULD NEVER DOZE
HOW LIKELY ARE YOU TO NOD OFF OR FALL ASLEEP WHILE SITTING QUIETLY AFTER LUNCH WITHOUT ALCOHOL: SLIGHT CHANCE OF DOZING
HOW LIKELY ARE YOU TO NOD OFF OR FALL ASLEEP WHILE LYING DOWN TO REST IN THE AFTERNOON WHEN CIRCUMSTANCES PERMIT: HIGH CHANCE OF DOZING
HOW LIKELY ARE YOU TO NOD OFF OR FALL ASLEEP WHILE SITTING AND READING: SLIGHT CHANCE OF DOZING
ESS-CHAD TOTAL SCORE: 6

## 2025-02-06 ASSESSMENT — PAIN SCALES - GENERAL: PAINLEVEL_OUTOF10: 0-NO PAIN

## 2025-02-06 NOTE — PROGRESS NOTES
Patient: Daryl Ferrara    [id number]  : 1945 -- AGE 79 y.o.    Provider: Ko Pearson MD     Location [facility]   Service Date: 2025              [facility]  Inspire Follow up Visit Note    Subjective   Patient ID: Daryl Ferrara is a 79 y.o. male who presents for Sleep Apnea and Inspire - Followup.  HPI    Prior sleep history:  2023: DARYL presents for routine visit to monitor response to Inspire therapy. The patient continues to report significant benefit from the device. DARYL denies any significant side effects or intolerance of therapy.  He reports that he is getting a tingling sensation in his neck and right shoulder any time of the day. He naps in a recliner occasionally. He is currently using 1.6v and does not believe that he can increase therapy. He believes that he may be more rested in the morning, but he believes that his medications are causing him to feel tired. The waveform was checked with adequate rise/fall. Tongue motion was assessed and deemed appropriate.  Implant date 10/20/2021  Prior Sleep studies: Fine tune study 3/11/22, therapeutic amplitude 1.7 v on default (+, -, +). Incoming amplitude (range) :1.6 v ( 1.6- 1.9v). Usage 40 hours per week. Start delay 30 minutes . Pause/delay 20 minutes . Duration of therapy 8 hours .   He had a therapeutic amplitude of 1.7 v during his prior fine tune titration,  2023 : Nights used > 4 hours: 78%. Hours used per night:5.1. Pauses per night: 1.4. Amplitude where the patient utilized therapy the most 1.7 volts. Functional threshold on 2023 1.2 volts     PERIODIC LIMB MOVEMENTS with a PMI 58 on sleep testing. He does have low ferritin low iron stores, denies RLS symptoms. He was prescribed oral iron but stopped due to constipation.    BALANCE ISSUES he is scheduled for an MRI through neurology  2024: Office Visit: Dr. Ko Pearson: Neurology  diagnosed him with neuropathy and flatfoot, undergoing PT.  Order to recheck iron levels for therapeutic amplitude inspire therapy     7/18/2024: Office Visit: Dr. Ko Pearson:  10/17/2024: Office Visit: Dr. Ko Pearson: Daryl denies  issues with his Inspire therapy. He is having issues with his shoulder had prior rotator cuff tear and is asking about an MRI. DEVICE INTERROGATION:  Average usage hours per week: 30 hours determined from device interrogation  CHANGES MADE AT TODAYS VISIT:   Outgoing settings: BIPOLAR CONFIGURATION (DEFAULT) : (+, -, +)     Outgoing amplitude 1.7 volts. Range adjusted to ( 1.5 - 1.7 volts) . Therapeutic amplitude 1.7 volts. Rate 33 Hz. Pulse Width 90 µs     Start delay 30   minutes. Pause/delay 20  minutes . Duration of therapy 8 hours .     The waveform was evaluated. An adequate rise and fall was observed.  Current sleep history:    Inspire appointment to evaluate Daryl Ferrara 's Sleep Apnea and Inspire - Followup    and response to therapy.   He slipped, fell on ice, lost consciousness and the garbage can fell on him. He has had left leg pain and he was going through a chiropractor. He went to  [facility] and he had imaging and no fracture.    Daryl reports  issues with his Inspire therapy. He states that he cannot feel the sensation of the inspire therapy    Inspire remote interrogation:   The Inspire cloud was available for review.    Inspire Remote interrogation via USB :      INCOMING SETTINGS:   BIPOLAR CONFIGURATION (DEFAULT) : (+, -, +)    Incoming Amplitude 1.7 volts. Incoming Range ( 1.5 - 1.7 v). Rate 33 Hz. Pulse Width 90 µs.     Start delay 30   minutes. Pause/delay 20  minutes . Duration of therapy 8 hours.       DEVICE INTERROGATION:    Electrode configurations tested:    BIPOLAR CONFIGURATION (DEFAULT) : (+, -, +)    CHANGES MADE AT TODAYS VISIT:   Outgoing settings:     BIPOLAR CONFIGURATION (DEFAULT) : (+, -, +)  Sensation threshold at today's visit was  "1.8 volts  Functional threshold at today's visit was 2.2 volts    Outgoing amplitude 2.2 volts. Range adjusted to ( 2.0 - 3.0 volts) .  Rate 33 Hz. Pulse Width 90 µs    Start delay 30   minutes. Pause/delay 30  minutes . Duration of therapy 8 hours .    The waveform was evaluated. An adequate rise and fall was observed.          Review of Systems  Review of systems negative except as per HPI   Lowman Sleepiness Scale: 6     Objective   /68   Pulse 76   Ht 1.702 m (5' 7\")   Wt 77.1 kg (170 lb)   SpO2 98%   BMI 26.63 kg/m²    PREVIOUS WEIGHTS:  Wt Readings from Last 3 Encounters:   02/06/25 77.1 kg (170 lb)   12/19/24 76.7 kg (169 lb)   12/13/24 78 kg (172 lb)     Physical Exam  PHYSICAL EXAM: GENERAL: alert pleasant and cooperative no acute distress  PSYCH EXAM: alert,oriented, in NAD with a full range of affect, normal behavior and no psychotic features   Tongue motion was assessed:  tongue protrusion was midline, no fasciculations noted  Tongue protrusion with stimulation was  tongue protrusion was midline, no fasciculations noted    Assessment/Plan   Problem List Items Addressed This Visit             ICD-10-CM    Obstructive sleep apnea, adult - Primary G47.33     He had a fall, trash can fell on his chest.  We checked impedances, waveform everything tested normal.  His functional amplitude has increased for an unknown reason.  Current functional 2.2 V recommended that he increase therapy to self titrate by going up 1 level every week to maximal comfort setting.  He will need retitration after he has achieved his most comfortable setting.   He reports higher settings because rubbing of his tongue against his teeth.  May need to change electrode configuration.  Follow-up in 6 weeks         Encounter for adjustment and management of neurostimulator Z45.42     Outgoing settings:     BIPOLAR CONFIGURATION (DEFAULT) : (+, -, +)  Sensation threshold at today's visit was 1.8 volts  Functional threshold at " today's visit was 2.2 volts    Outgoing amplitude 2.2 volts. Range adjusted to ( 2.0 - 3.0 volts) .  Rate 33 Hz. Pulse Width 90 µs    Start delay 30   minutes. Pause/delay 30  minutes . Duration of therapy 8 hours .

## 2025-02-06 NOTE — ASSESSMENT & PLAN NOTE
He had a fall, trash can fell on his chest.  We checked impedances, waveform everything tested normal.  His functional amplitude has increased for an unknown reason.  Current functional 2.2 V recommended that he increase therapy to self titrate by going up 1 level every week to maximal comfort setting.  He will need retitration after he has achieved his most comfortable setting.   He reports higher settings because rubbing of his tongue against his teeth.  May need to change electrode configuration.  Follow-up in 6 weeks

## 2025-02-06 NOTE — ASSESSMENT & PLAN NOTE
Outgoing settings:     BIPOLAR CONFIGURATION (DEFAULT) : (+, -, +)  Sensation threshold at today's visit was 1.8 volts  Functional threshold at today's visit was 2.2 volts    Outgoing amplitude 2.2 volts. Range adjusted to ( 2.0 - 3.0 volts) .  Rate 33 Hz. Pulse Width 90 µs    Start delay 30   minutes. Pause/delay 30  minutes . Duration of therapy 8 hours .

## 2025-02-20 ENCOUNTER — APPOINTMENT (OUTPATIENT)
Dept: SLEEP MEDICINE | Facility: CLINIC | Age: 80
End: 2025-02-20
Payer: MEDICARE

## 2025-03-07 DIAGNOSIS — M54.50 LOW BACK PAIN, UNSPECIFIED BACK PAIN LATERALITY, UNSPECIFIED CHRONICITY, UNSPECIFIED WHETHER SCIATICA PRESENT: ICD-10-CM

## 2025-03-07 DIAGNOSIS — K21.9 GASTROESOPHAGEAL REFLUX DISEASE, UNSPECIFIED WHETHER ESOPHAGITIS PRESENT: ICD-10-CM

## 2025-03-07 RX ORDER — MELOXICAM 15 MG/1
TABLET ORAL
Qty: 45 TABLET | Refills: 1 | Status: SHIPPED | OUTPATIENT
Start: 2025-03-07

## 2025-03-07 RX ORDER — PANTOPRAZOLE SODIUM 40 MG/1
TABLET, DELAYED RELEASE ORAL
Qty: 90 TABLET | Refills: 3 | Status: SHIPPED | OUTPATIENT
Start: 2025-03-07

## 2025-04-02 ENCOUNTER — TELEPHONE (OUTPATIENT)
Dept: PRIMARY CARE | Facility: CLINIC | Age: 80
End: 2025-04-02

## 2025-04-02 ENCOUNTER — APPOINTMENT (OUTPATIENT)
Dept: PRIMARY CARE | Facility: CLINIC | Age: 80
End: 2025-04-02
Payer: MEDICARE

## 2025-04-02 VITALS
SYSTOLIC BLOOD PRESSURE: 134 MMHG | TEMPERATURE: 97.7 F | DIASTOLIC BLOOD PRESSURE: 62 MMHG | HEIGHT: 67 IN | OXYGEN SATURATION: 96 % | WEIGHT: 174 LBS | HEART RATE: 79 BPM | BODY MASS INDEX: 27.31 KG/M2

## 2025-04-02 DIAGNOSIS — Z00.00 WELL ADULT EXAM: Primary | ICD-10-CM

## 2025-04-02 DIAGNOSIS — D64.9 ANEMIA, UNSPECIFIED TYPE: ICD-10-CM

## 2025-04-02 DIAGNOSIS — E11.9 TYPE 2 DIABETES MELLITUS WITHOUT COMPLICATION, WITHOUT LONG-TERM CURRENT USE OF INSULIN: ICD-10-CM

## 2025-04-02 DIAGNOSIS — B34.9 VIRAL SYNDROME: ICD-10-CM

## 2025-04-02 DIAGNOSIS — I10 BENIGN ESSENTIAL HYPERTENSION: ICD-10-CM

## 2025-04-02 DIAGNOSIS — E78.5 HYPERLIPIDEMIA, UNSPECIFIED HYPERLIPIDEMIA TYPE: ICD-10-CM

## 2025-04-02 LAB — POC HEMOGLOBIN A1C: 6.7 % (ref 4.2–6.5)

## 2025-04-02 PROCEDURE — 1123F ACP DISCUSS/DSCN MKR DOCD: CPT | Performed by: FAMILY MEDICINE

## 2025-04-02 PROCEDURE — 3078F DIAST BP <80 MM HG: CPT | Performed by: FAMILY MEDICINE

## 2025-04-02 PROCEDURE — 83036 HEMOGLOBIN GLYCOSYLATED A1C: CPT | Performed by: FAMILY MEDICINE

## 2025-04-02 PROCEDURE — 1157F ADVNC CARE PLAN IN RCRD: CPT | Performed by: FAMILY MEDICINE

## 2025-04-02 PROCEDURE — 3075F SYST BP GE 130 - 139MM HG: CPT | Performed by: FAMILY MEDICINE

## 2025-04-02 PROCEDURE — 1126F AMNT PAIN NOTED NONE PRSNT: CPT | Performed by: FAMILY MEDICINE

## 2025-04-02 PROCEDURE — 1159F MED LIST DOCD IN RCRD: CPT | Performed by: FAMILY MEDICINE

## 2025-04-02 PROCEDURE — 1036F TOBACCO NON-USER: CPT | Performed by: FAMILY MEDICINE

## 2025-04-02 PROCEDURE — 99214 OFFICE O/P EST MOD 30 MIN: CPT | Performed by: FAMILY MEDICINE

## 2025-04-02 ASSESSMENT — ENCOUNTER SYMPTOMS
DEPRESSION: 0
OCCASIONAL FEELINGS OF UNSTEADINESS: 1
LOSS OF SENSATION IN FEET: 0

## 2025-04-02 ASSESSMENT — PATIENT HEALTH QUESTIONNAIRE - PHQ9
1. LITTLE INTEREST OR PLEASURE IN DOING THINGS: NOT AT ALL
SUM OF ALL RESPONSES TO PHQ9 QUESTIONS 1 AND 2: 0
2. FEELING DOWN, DEPRESSED OR HOPELESS: NOT AT ALL

## 2025-04-02 ASSESSMENT — PAIN SCALES - GENERAL: PAINLEVEL_OUTOF10: 0-NO PAIN

## 2025-04-02 NOTE — TELEPHONE ENCOUNTER
Spoke with patient and he wants  still come in for his regular appointment.  Was advised to wear a mask.

## 2025-04-02 NOTE — TELEPHONE ENCOUNTER
Patient has a sore throat, coughing, chest congestion and a runny nose. He wants to know if he should come in for his appointment today, 04-02-25.    Please advise    Patient can be reached at [phone]

## 2025-04-02 NOTE — PROGRESS NOTES
"Subjective   Patient ID: Daryl Ferrara is a 80 y.o. male who presents for Diabetes (Eye exam done 9/26/2025/A1C today in the office  6.7), Sinus Problem (Congestion and post nasal drainage), and Cough (With chest congestion-  no sputum production, scratchy throat.  No ear pain or blocking. No fever.  No body aches.    /Symptoms started yesterday.).    HPI here for multiple issues.  Patient has diabetes.  He is currently on metformin 1000 mg twice daily, pioglitazone 30 mg daily.  Recent hemoglobin A1c 6.7 in the office today.  Patient denies any GI upset or loose bowels.  Patient is complaining of a 1 day history of congestion with a scratchy throat and mild sputum production.    Review of Systems  Constitutional: Patient is negative for fever, fatigue, weight change.  HEENT: Patient is positive for scratchy throat with congestion and postnasal drip.  He is negative for change in vision, hearing, swallow.  Cardio: Patient is negative for chest pain, lower extremity edema.  Pulmonary: Patient is negative for cough, shortness of breath.  Objective   /62 (BP Location: Left arm, Patient Position: Sitting, BP Cuff Size: Adult)   Pulse 79   Temp 36.5 °C (97.7 °F) (Temporal)   Ht 1.702 m (5' 7\")   Wt 78.9 kg (174 lb)   SpO2 96%   BMI 27.25 kg/m²     Physical Exam  General: Awake and alert no apparent distress.  HEENT: Moist oral mucosa no cervical lymphadenopathy.  TMs with good color and light reflex.  Cardio: Heart S1-S2 no murmur rub or gallop.  Pulmonary: Lungs clear to auscultation bilaterally.  Extremities: Warm and dry with good dorsalis pedis pulse.  Patient is couple plaques on the dorsum of each foot consistent with his recurrent eczema.  Feet are warm and dry without skin breaks.  Assessment/Plan   Problem List Items Addressed This Visit             ICD-10-CM    Anemia monitor. D64.9     Other Visit Diagnoses         Codes    Well adult exam    -  Primary Z00.00    Relevant Orders    Follow Up " In Primary Care - Medicare Annual    Type 2 diabetes mellitus without complication, without long-term current use of insulin    stable.  Continue on metformin 1000 mg twice daily, pioglitazone 30 mg daily.  Patient will follow-up in 6 months for physical exam. E11.9    Relevant Orders    Collection of Capillary Blood Specimen (Completed)    POCT glycosylated hemoglobin (Hb A1C) manually resulted (Completed)    Follow Up In Primary Care - Medicare Annual    Viral syndrome    needs better control.  Patient to treat symptomatically with Tylenol for discomfort.  He is to drink plenty fluids and get plenty of rest. B34.9

## 2025-04-03 ENCOUNTER — APPOINTMENT (OUTPATIENT)
Dept: SLEEP MEDICINE | Facility: CLINIC | Age: 80
End: 2025-04-03
Payer: MEDICARE

## 2025-04-03 VITALS
HEART RATE: 77 BPM | HEIGHT: 67 IN | WEIGHT: 170 LBS | BODY MASS INDEX: 26.68 KG/M2 | OXYGEN SATURATION: 96 % | SYSTOLIC BLOOD PRESSURE: 128 MMHG | DIASTOLIC BLOOD PRESSURE: 70 MMHG

## 2025-04-03 DIAGNOSIS — Z45.42 ENCOUNTER FOR ADJUSTMENT AND MANAGEMENT OF NEUROSTIMULATOR: ICD-10-CM

## 2025-04-03 DIAGNOSIS — G47.33 OBSTRUCTIVE SLEEP APNEA, ADULT: Primary | ICD-10-CM

## 2025-04-03 PROCEDURE — 3074F SYST BP LT 130 MM HG: CPT | Performed by: INTERNAL MEDICINE

## 2025-04-03 PROCEDURE — 1126F AMNT PAIN NOTED NONE PRSNT: CPT | Performed by: INTERNAL MEDICINE

## 2025-04-03 PROCEDURE — 3078F DIAST BP <80 MM HG: CPT | Performed by: INTERNAL MEDICINE

## 2025-04-03 PROCEDURE — 1036F TOBACCO NON-USER: CPT | Performed by: INTERNAL MEDICINE

## 2025-04-03 PROCEDURE — 95977 ALYS CPLX CN NPGT PRGRMG: CPT | Performed by: INTERNAL MEDICINE

## 2025-04-03 PROCEDURE — 1123F ACP DISCUSS/DSCN MKR DOCD: CPT | Performed by: INTERNAL MEDICINE

## 2025-04-03 PROCEDURE — 1159F MED LIST DOCD IN RCRD: CPT | Performed by: INTERNAL MEDICINE

## 2025-04-03 PROCEDURE — 1157F ADVNC CARE PLAN IN RCRD: CPT | Performed by: INTERNAL MEDICINE

## 2025-04-03 PROCEDURE — 99214 OFFICE O/P EST MOD 30 MIN: CPT | Performed by: INTERNAL MEDICINE

## 2025-04-03 ASSESSMENT — SLEEP AND FATIGUE QUESTIONNAIRES
SITING INACTIVE IN A PUBLIC PLACE LIKE A CLASS ROOM OR A MOVIE THEATER: WOULD NEVER DOZE
HOW LIKELY ARE YOU TO NOD OFF OR FALL ASLEEP WHILE LYING DOWN TO REST IN THE AFTERNOON WHEN CIRCUMSTANCES PERMIT: MODERATE CHANCE OF DOZING
HOW LIKELY ARE YOU TO NOD OFF OR FALL ASLEEP WHILE SITTING AND READING: MODERATE CHANCE OF DOZING
DIFFICULTY_STAYING_ASLEEP: MODERATE
HOW LIKELY ARE YOU TO NOD OFF OR FALL ASLEEP WHILE WATCHING TV: SLIGHT CHANCE OF DOZING
HOW LIKELY ARE YOU TO NOD OFF OR FALL ASLEEP WHILE SITTING QUIETLY AFTER LUNCH WITHOUT ALCOHOL: WOULD NEVER DOZE
SLEEP_PROBLEM_NOTICEABLE_TO_OTHERS: NOT AT ALL NOTICEABLE
HOW LIKELY ARE YOU TO NOD OFF OR FALL ASLEEP IN A CAR, WHILE STOPPED FOR A FEW MINUTES IN TRAFFIC: WOULD NEVER DOZE
SATISFACTION_WITH_CURRENT_SLEEP_PATTERN: SATISFIED
HOW LIKELY ARE YOU TO NOD OFF OR FALL ASLEEP WHEN YOU ARE A PASSENGER IN A CAR FOR AN HOUR WITHOUT A BREAK: WOULD NEVER DOZE
DIFFICULTY_FALLING_ASLEEP: MODERATE
WAKING_TOO_EARLY: MODERATE
HOW LIKELY ARE YOU TO NOD OFF OR FALL ASLEEP WHILE SITTING AND TALKING TO SOMEONE: WOULD NEVER DOZE
SLEEP_PROBLEM_INTERFERES_DAILY_ACTIVITIES: NOT AT ALL NOTICEABLE
WORRIED_DISTRESSED_DUE_TO_SLEEP: A LITTLE
ESS-CHAD TOTAL SCORE: 5

## 2025-04-03 ASSESSMENT — PAIN SCALES - GENERAL: PAINLEVEL_OUTOF10: 0-NO PAIN

## 2025-04-03 NOTE — PROGRESS NOTES
Patient: Daryl Ferrara    [id number]  : 1945 -- AGE 80 y.o.    Provider: Ko Pearson MD     Location [facility]   Service Date: 4/3/2025              [facility]  Inspire Follow up Visit Note    Subjective   Patient ID: Daryl Ferrara is a 80 y.o. male who presents for Sleep Apnea and Inspire - Followup.  HPI    Prior sleep history:  2023: DARYL presents for routine visit to monitor response to Inspire therapy. The patient continues to report significant benefit from the device. DARYL denies any significant side effects or intolerance of therapy.  He reports that he is getting a tingling sensation in his neck and right shoulder any time of the day. He naps in a recliner occasionally. He is currently using 1.6v and does not believe that he can increase therapy. He believes that he may be more rested in the morning, but he believes that his medications are causing him to feel tired. The waveform was checked with adequate rise/fall. Tongue motion was assessed and deemed appropriate.  Implant date 10/20/2021  Prior Sleep studies: Fine tune study 3/11/22, therapeutic amplitude 1.7 v on default (+, -, +). Incoming amplitude (range) :1.6 v ( 1.6- 1.9v). Usage 40 hours per week. Start delay 30 minutes . Pause/delay 20 minutes . Duration of therapy 8 hours .   He had a therapeutic amplitude of 1.7 v during his prior fine tune titration,  2023 : Nights used > 4 hours: 78%. Hours used per night:5.1. Pauses per night: 1.4. Amplitude where the patient utilized therapy the most 1.7 volts. Functional threshold on 2023 1.2 volts     PERIODIC LIMB MOVEMENTS with a PMI 58 on sleep testing. He does have low ferritin low iron stores, denies RLS symptoms. He was prescribed oral iron but stopped due to constipation.    BALANCE ISSUES he is scheduled for an MRI through neurology  2024: Office Visit: Dr. Ko Pearson: Neurology  diagnosed him with neuropathy and flatfoot, undergoing PT.  Order to recheck iron levels for therapeutic amplitude inspire therapy     7/18/2024: Office Visit: Dr. Ko Pearson:  10/17/2024: Office Visit: Dr. Ko Pearson: Daryl denies  issues with his Inspire therapy. He is having issues with his shoulder had prior rotator cuff tear and is asking about an MRI. DEVICE INTERROGATION:  Average usage hours per week: 30 hours determined from device interrogation  CHANGES MADE AT TODAYS VISIT:   Outgoing settings: BIPOLAR CONFIGURATION (DEFAULT) : (+, -, +)     Outgoing amplitude 1.7 volts. Range adjusted to ( 1.5 - 1.7 volts) . Therapeutic amplitude 1.7 volts. Rate 33 Hz. Pulse Width 90 µs     Start delay 30   minutes. Pause/delay 20  minutes . Duration of therapy 8 hours .     The waveform was evaluated. An adequate rise and fall was observed.    2/6/2025: Office Visit: Dr. Ko Pearson:He slipped, fell on ice, lost consciousness and the garbage can fell on him. He has had left leg pain and he was going through a chiropractor. He went to  [facility] and he had imaging and no fracture.     Daryl reports  issues with his Inspire therapy. He states that he cannot feel the sensation of the inspire therapy.    INCOMING SETTINGS:   BIPOLAR CONFIGURATION (DEFAULT) : (+, -, +)     Incoming Amplitude 1.7 volts. Incoming Range ( 1.5 - 1.7 v). Rate 33 Hz. Pulse Width 90 µs.      Start delay 30   minutes. Pause/delay 20  minutes . Duration of therapy 8 hours.         DEVICE INTERROGATION:     Electrode configurations tested:    BIPOLAR CONFIGURATION (DEFAULT) : (+, -, +)     CHANGES MADE AT TODAYS VISIT:   Outgoing settings:      BIPOLAR CONFIGURATION (DEFAULT) : (+, -, +)  Sensation threshold at today's visit was 1.8 volts  Functional threshold at today's visit was 2.2 volts     Outgoing amplitude 2.2 volts. Range adjusted to ( 2.0 - 3.0 volts) .  Rate 33 Hz. Pulse Width 90 µs     Start delay 30   minutes. Pause/delay 30   minutes . Duration of therapy 8 hours .     The waveform was evaluated. An adequate rise and fall was observed.    INCOMING SETTINGS:   BIPOLAR CONFIGURATION (DEFAULT) : (+, -, +)     Incoming Amplitude 1.7 volts. Incoming Range ( 1.5 - 1.7 v). Rate 33 Hz. Pulse Width 90 µs.      Start delay 30   minutes. Pause/delay 20  minutes . Duration of therapy 8 hours.         DEVICE INTERROGATION:     Electrode configurations tested:    BIPOLAR CONFIGURATION (DEFAULT) : (+, -, +)     CHANGES MADE AT TODAYS VISIT:   Outgoing settings:      BIPOLAR CONFIGURATION (DEFAULT) : (+, -, +)  Sensation threshold at today's visit was 1.8 volts  Functional threshold at today's visit was 2.2 volts     Outgoing amplitude 2.2 volts. Range adjusted to ( 2.0 - 3.0 volts) .  Rate 33 Hz. Pulse Width 90 µs     Start delay 30   minutes. Pause/delay 30  minutes . Duration of therapy 8 hours .     The waveform was evaluated. An adequate rise and fall was observed.          Obstructive sleep apnea, adult - Primary G47.33. He had a fall, trash can fell on his chest.  We checked impedances, waveform everything tested normal.  His functional amplitude has increased for an unknown reason.  Current functional 2.2 V recommended that he increase therapy to self titrate by going up 1 level every week to maximal comfort setting.  He will need retitration after he has achieved his most comfortable setting.   He reports higher settings because rubbing of his tongue against his teeth.  May need to change electrode configuration.  Follow-up in 6 weeks  Encounter for adjustment and management of neurostimulator Z45.42. Outgoing settings:  BIPOLAR CONFIGURATION (DEFAULT) : (+, -, +). Sensation threshold at today's visit was 1.8 volts. Functional threshold at today's visit was 2.2 volts   Outgoing amplitude 2.2 volts. Range adjusted to ( 2.0 - 3.0 volts) .  Rate 33 Hz. Pulse Width 90 µs. Start delay 30   minutes. Pause/delay 30  minutes . Duration of therapy 8  "hours .    Current sleep history:    Inspire appointment to evaluate Daryl Ferrara 's Sleep Apnea and Inspire - Followup   and response to therapy.     Daryl denies  issues with his Inspire therapy.     Inspire remote interrogation:   The Inspire cloud was available for review.    Inspire Remote interrogation via USB or bluetooth:       DEVICE INTERROGATION:  INCOMING SETTINGS:   BIPOLAR CONFIGURATION (DEFAULT) : (+, -, +) . Incoming Amplitude  2.2 volts. Range adjusted to ( 2.0 - 3.0 volts) .  Rate 33 Hz. Pulse Width 90 µs. Start delay 30   minutes. Pause/delay 30  minutes . Duration of therapy 8 hours . Average usage hours per week: 0 hours determined from device interrogation. Sensation threshold at today's visit was 1.2 volts. Functional threshold at today's visit was 1.4 volts     CHANGES MADE AT TODAYS VISIT:   Outgoing settings:  BIPOLAR CONFIGURATION (DEFAULT) : (+, -, +) .Outgoing amplitude 1.7 volts. Range ( 1.5 - 1.9 volts) .  Rate 33 Hz. Pulse Width 90 µs. Start delay increased to 45  minutes. Pause/delay increased to 30  minutes . Duration of therapy 8 hours . Therapeutic amplitude 1.7 volts. The waveform was evaluated. An adequate rise and fall was observed.    Review of Systems  Review of systems negative except as per HPI   [place] Sleepiness Scale: 5     Objective   /70   Pulse 77   Ht 1.702 m (5' 7\")   Wt 77.1 kg (170 lb)   SpO2 96%   BMI 26.63 kg/m²    PREVIOUS WEIGHTS:  Wt Readings from Last 3 Encounters:   04/03/25 77.1 kg (170 lb)   04/02/25 78.9 kg (174 lb)   02/06/25 77.1 kg (170 lb)     Physical Exam  PHYSICAL EXAM: GENERAL: alert pleasant and cooperative no acute distress  PSYCH EXAM: alert,oriented, in NAD with a full range of affect, normal behavior and no psychotic features   Tongue motion was assessed:  tongue protrusion was midline, no fasciculations noted  Tongue protrusion with stimulation was  tongued protruded to the left without fasciculations    Ferritin " "(ng/mL)   Date Value   08/06/2024 67     Iron (ug/dL)   Date Value   08/06/2024 48     TIBC (ug/dL)   Date Value   08/06/2024 331     % Saturation (%)   Date Value   08/06/2024 15 (L)      No results found for: \"[id number]\"      Assessment/Plan   Problem List Items Addressed This Visit             ICD-10-CM    Obstructive sleep apnea, adult - Primary G47.33     He was unable to tolerate most recent setting changes suggestive of over titration.  We rechecked his functional and sensation levels and they are back to baseline.  Indicating that his therapeutic amplitude is likely at his baseline.  We adjusted his settings accordingly found them comfortable we will reevaluate in 2 months.  If he is able to tolerate and use therapy then we will perform an HSAT at his current setting of 1.7 V.  Recommended him to avoid increasing therapy even if he does not feel therapy it is working if the green light is on         Encounter for adjustment and management of neurostimulator Z45.42     Outgoing settings:  BIPOLAR CONFIGURATION (DEFAULT) : (+, -, +) .Outgoing amplitude 1.7 volts. Range ( 1.5 - 1.9 volts) .  Rate 33 Hz. Pulse Width 90 µs. Start delay increased to 45  minutes. Pause/delay increased to 30  minutes . Duration of therapy 8 hours . Therapeutic amplitude 1.7 volts. The waveform was evaluated. An adequate rise and fall was observed.             "

## 2025-04-03 NOTE — ASSESSMENT & PLAN NOTE
He was unable to tolerate most recent setting changes suggestive of over titration.  We rechecked his functional and sensation levels and they are back to baseline.  Indicating that his therapeutic amplitude is likely at his baseline.  We adjusted his settings accordingly found them comfortable we will reevaluate in 2 months.  If he is able to tolerate and use therapy then we will perform an HSAT at his current setting of 1.7 V.  Recommended him to avoid increasing therapy even if he does not feel therapy it is working if the green light is on

## 2025-04-03 NOTE — ASSESSMENT & PLAN NOTE
Outgoing settings:  BIPOLAR CONFIGURATION (DEFAULT) : (+, -, +) .Outgoing amplitude 1.7 volts. Range ( 1.5 - 1.9 volts) .  Rate 33 Hz. Pulse Width 90 µs. Start delay increased to 45  minutes. Pause/delay increased to 30  minutes . Duration of therapy 8 hours . Therapeutic amplitude 1.7 volts. The waveform was evaluated. An adequate rise and fall was observed.

## 2025-04-17 ENCOUNTER — APPOINTMENT (OUTPATIENT)
Dept: SLEEP MEDICINE | Facility: CLINIC | Age: 80
End: 2025-04-17
Payer: MEDICARE

## 2025-04-29 ENCOUNTER — TELEPHONE (OUTPATIENT)
Dept: PRIMARY CARE | Facility: CLINIC | Age: 80
End: 2025-04-29
Payer: MEDICARE

## 2025-04-29 DIAGNOSIS — G89.29 CHRONIC LOW BACK PAIN WITHOUT SCIATICA, UNSPECIFIED BACK PAIN LATERALITY: Primary | ICD-10-CM

## 2025-04-29 DIAGNOSIS — M54.50 CHRONIC LOW BACK PAIN WITHOUT SCIATICA, UNSPECIFIED BACK PAIN LATERALITY: Primary | ICD-10-CM

## 2025-04-29 NOTE — TELEPHONE ENCOUNTER
Patient would like a referral for Physical Therapy for Back Pain    Patient can be reached at [phone]

## 2025-05-08 ENCOUNTER — EVALUATION (OUTPATIENT)
Dept: PHYSICAL THERAPY | Facility: CLINIC | Age: 80
End: 2025-05-08
Payer: MEDICARE

## 2025-05-08 DIAGNOSIS — M54.50 CHRONIC LOW BACK PAIN WITHOUT SCIATICA, UNSPECIFIED BACK PAIN LATERALITY: ICD-10-CM

## 2025-05-08 DIAGNOSIS — G89.29 CHRONIC LOW BACK PAIN WITHOUT SCIATICA, UNSPECIFIED BACK PAIN LATERALITY: ICD-10-CM

## 2025-05-08 PROCEDURE — 97162 PT EVAL MOD COMPLEX 30 MIN: CPT | Mod: GP

## 2025-05-08 PROCEDURE — 97110 THERAPEUTIC EXERCISES: CPT | Mod: GP

## 2025-05-08 ASSESSMENT — ENCOUNTER SYMPTOMS
QUALITY: DULL ACHE
QUALITY: TIGHT
ALLEVIATING FACTORS: MEDICATIONS
PAIN SCALE AT HIGHEST: 8
OCCASIONAL FEELINGS OF UNSTEADINESS: 1
LOSS OF SENSATION IN FEET: 1
QUALITY: RADIATING
DEPRESSION: 0

## 2025-05-08 ASSESSMENT — PAIN - FUNCTIONAL ASSESSMENT: PAIN_FUNCTIONAL_ASSESSMENT: 0-10

## 2025-05-08 ASSESSMENT — PAIN SCALES - GENERAL: PAINLEVEL_OUTOF10: 2

## 2025-05-08 ASSESSMENT — ACTIVITIES OF DAILY LIVING (ADL): POOR_BALANCE: 1

## 2025-05-08 NOTE — PROGRESS NOTES
Physical Therapy Evaluation and Treatment     Patient Name: Daryl Ferrara  MRN: [id number]  Encounter date: 2025  Time Calculation  Start Time: 810  Stop Time: 0855  Time Calculation (min): 45 min  PT Evaluation Time Entry  PT Evaluation (Moderate) Time Entry: 25  PT Therapeutic Procedures Time Entry  Therapeutic Exercise Time Entry: 20    Visit # 1 of 10  Visits/Dates Authorized: MMO - NO AUTH / $30 copay / $1000 OOP not met / 40V ot/pt - 0 used / Availity [id number] / ds 25 // CPT 40469 Not covered. CPT 59446 requires authorization.      Current Problem:   Problem List Items Addressed This Visit           ICD-10-CM    Low back pain M54.50    Relevant Orders    Follow Up In Physical Therapy     Precautions:  Precautions  HALEYADI Fall Risk Score (The score of 4 or more indicates an increased risk of falling): 10   High fall risk, H/o chronic back pain, diabetes, HTN, neuropathy, OA    Subjective Evaluation    History of Present Illness  Date of onset: 2020  Mechanism of injury: 80 y.o. male presenting with primary complaint of chronic low back pain. Patient had 2 recent falls, one in 2024 when he slipped on ice and aggravated back symptoms. Had chiropractic treatments which really helped reduce symptoms. Had another fall in 2025 when he fall down the stairs, with onset of L sided low back and tailbone pain. Stated he was black and blue all down his L side of this body after the fall. Hasn't done any specific treatment to address current L LBP. Currently ambulates with a cane on the right side to aid in L sided low back and sciatica pain.     Pain  At worst pain ratin  Location: Left low back, radiating L posterior thigh to knee  Quality: dull ache, tight and radiating  Relieving factors: medications (tyelenol)  Exacerbated by: standing, walking.    Social Support  Lives in: multiple-level home (stairs to basement, difficulty with stairs)  Lives with: alone    Treatments  Previous  treatment: chiropractic  Patient Goals  Patient goals for therapy: decreased pain, increased motion, increased strength, independence with ADLs/IADLs and improved balance        Vitals:   WFL    Objective     Neurological Testing     Additional Neurological Details  Reports occasional numbness in feet    Palpation   Left   Hypertonic in the lumbar paraspinals.   Tenderness of the lumbar paraspinals.     Active Range of Motion     Lumbar   Flexion: WFL  Extension: Active lumbar extension: 75% limited, LBP. with pain  Left lateral flexion: Active left lumbar lateral flexion: 50% limited, L LBP. with pain  Right lateral flexion: WFL    Strength/Myotome Testing     Left Hip   Planes of Motion   Flexion: 4  Extension: 4-  Abduction: 4  Adduction: 4-    Right Hip   Planes of Motion   Flexion: 4  Extension: 4-  Abduction: 4  Adduction: 4-    Left Knee   Flexion: 4 (Mild posterior hip/thigh pain)  Extension: 5    Right Knee   Flexion: 5  Extension: 5    Muscle Activation     Additional Muscle Activation Details  TA and core strength deficits     Balance:   To be assessed at future visits    Other Outcome Measures:  Other Measures  Oswestry Disablity Index (RHODA): Modified LBD = future visits    Treatments:     Therapeutic Exercise 59898:   HEP performance and review - see below  Seated Tball fw rolls for lumbar flexion  Future: Core/LE mobility and strength progressions    Neuromuscular Re-Ed 36585: DNP  Future: balance progressions    Manual Therapy 73915:   Future: STM if indicated    Modalities:   Future: TENS, DN if indicated    HEP / Access Codes URL: [email or web address]  Access Code: [email or web address]  Date: 05/08/2025  - Supine Lower Trunk Rotation  - 1 x daily - 30 reps  - Hooklying Single Knee to Chest Stretch  - 1 x daily - 2 sets - 20 seconds hold  - Supine Piriformis Stretch with Foot on Ground  - 1 x daily - 2 sets - 20 seconds hold  - Supine Posterior Pelvic Tilt  - 1 x daily - 15 reps - 5 seconds hold  -  Supine Hip Adduction Isometric with Ball  - 1 x daily - 15 reps - 5 seconds hold  - Hooklying Clamshell with Resistance  - 1 x daily - 15 reps - 5 seconds hold  - Seated Sciatic Tensioner  - 1 x daily - 15 reps    Assessment & Plan     Assessment  Impairments: abnormal coordination, abnormal or restricted ROM, impaired balance, impaired physical strength, lacks appropriate home exercise program and pain with function  Assessment details: Patient presents with chronic low back, as well as onset of L low back and radiating LLE pain with mobility and core strength deficits. Patient also presents with balance impairments, neuropathy, and history of 2 recent falls since December 2024 that may negatively impact low back pain rehab progression.  Prognosis: good    Plan  Therapy options: will be seen for skilled physical therapy services  Planned modality interventions: cryotherapy, electrical stimulation/Russian stimulation, TENS, thermotherapy (hydrocollator packs), ultrasound and traction  Other planned modality interventions: Dry needling  Planned therapy interventions: therapeutic activities, stretching, strengthening, spinal/joint mobilization, soft tissue mobilization, postural training, neuromuscular re-education, manual therapy, abdominal trunk stabilization, body mechanics training, flexibility, functional ROM exercises, home exercise program, joint mobilization and balance/weight-bearing training  Frequency: 1x week  Duration in visits: 10  Treatment plan discussed with: patient          Moderate complexity due to patient's clinical presentation being evolving with changing characteristics, with comorbidities/complexities to include patient age, chronic back pain, diabetes, HTN, neuropathy, OA, history of falls, all of which may negatively impact rehab tolerance and progression.     Post-Treatment Symptoms:  Response to Interventions: No change in pain    Goals:   Active       PT Problem       PT Goal 1        Start:  05/08/25    Expected End:  07/31/25       1) Pt will report pain levels no greater than 1/10 at worst with activity for less difficulty standing, walking, lifting, and transferring.   2) Pt will display improved pain-free lumbar spine AROM WFL for less difficulty standing, walking, twisting, lifting, transferring.  3) Pt will score <20% impairment on Low Back Disability Questionnaire to indicate minimal low back dysfunction.  4) Pt will improve pain-free bilateral hip and knee strength to at least 4+/5 for all major muscle groups for improved functional strength with transferring, stairs, and general mobility.    5) Pt will displays improved steadiness on feet with least restrictive AD for improved mobility with transfers and walking.

## 2025-05-13 ENCOUNTER — TREATMENT (OUTPATIENT)
Dept: PHYSICAL THERAPY | Facility: CLINIC | Age: 80
End: 2025-05-13
Payer: MEDICARE

## 2025-05-13 DIAGNOSIS — M54.50 CHRONIC LOW BACK PAIN WITHOUT SCIATICA, UNSPECIFIED BACK PAIN LATERALITY: ICD-10-CM

## 2025-05-13 DIAGNOSIS — G89.29 CHRONIC LOW BACK PAIN WITHOUT SCIATICA, UNSPECIFIED BACK PAIN LATERALITY: ICD-10-CM

## 2025-05-13 PROCEDURE — 97110 THERAPEUTIC EXERCISES: CPT | Mod: GP

## 2025-05-20 ENCOUNTER — APPOINTMENT (OUTPATIENT)
Dept: PHYSICAL THERAPY | Facility: CLINIC | Age: 80
End: 2025-05-20
Payer: MEDICARE

## 2025-05-27 ENCOUNTER — TREATMENT (OUTPATIENT)
Dept: PHYSICAL THERAPY | Facility: CLINIC | Age: 80
End: 2025-05-27
Payer: MEDICARE

## 2025-05-27 DIAGNOSIS — G89.29 CHRONIC LOW BACK PAIN WITHOUT SCIATICA, UNSPECIFIED BACK PAIN LATERALITY: ICD-10-CM

## 2025-05-27 DIAGNOSIS — M54.50 CHRONIC LOW BACK PAIN WITHOUT SCIATICA, UNSPECIFIED BACK PAIN LATERALITY: ICD-10-CM

## 2025-05-27 PROCEDURE — 97110 THERAPEUTIC EXERCISES: CPT | Mod: GP

## 2025-05-27 ASSESSMENT — PAIN - FUNCTIONAL ASSESSMENT: PAIN_FUNCTIONAL_ASSESSMENT: 0-10

## 2025-05-27 ASSESSMENT — PAIN SCALES - GENERAL: PAINLEVEL_OUTOF10: 2

## 2025-05-27 NOTE — PROGRESS NOTES
"Physical Therapy Treatment    Patient Name: Daryl Ferrara  MRN: [id number]  Encounter date: 5/27/2025 PT Received On: 05/27/25  Response to Previous Treatment: Patient with no complaints from previous session.  Time Calculation  Start Time: 1530  Stop Time: 1611  Time Calculation (min): 41 min  PT Therapeutic Procedures Time Entry  Therapeutic Exercise Time Entry: 41    Visit # 3 of 10  Visits/Dates Authorized: MMO - NO AUTH / $30 copay / $1000 OOP not met / 40V ot/pt - 0 used / Availity [id number] / ds 5/7/25 // CPT 20996 Not covered. CPT 80679 requires authorization.    Current Problem:   Problem List Items Addressed This Visit           ICD-10-CM    Low back pain M54.50       Precautions: Precautions  STEADI Fall Risk Score (The score of 4 or more indicates an increased risk of falling): 10   High fall risk, H/o chronic back pain, diabetes, HTN, neuropathy, OA   Hx of x 2 falls December and March.      Subjective   General: Pt mentions being busy with activities outside of therapy at home and will have to perform exercises in groups to split up the day as pt is unsure if he will be able to perform them all in one sitting.    Pre-Treatment Symptoms:   Pain Assessment: 0-10  0-10 (Numeric) Pain Score: 2  Pain Location: Leg  Pain Radiating Towards: \"sciatica\"  Pain Orientation: Left2-3/10    Objective   Difficulty with performance of exercises requiring increased cuing and time for performance. Cues for sciatic nerve glide.     Treatments:      Therapeutic Exercise 72743:   Nu-step x 8 min LE only    *Extra time required for eduction and review of exercises on HEP and during session.*  Stair stretching 3 reps alt as needed   calf stretching    Hang 3 x 20 sec     slant board 3 min   HS stretch 20 sec holds   Hip flex lunge 20 sec holds  Seated   Sciatic nerve glide x 10 R/L   Piriformis stretch 3 x 20 sec  Mat table:   Prone lying   Prone press ups on elbows    Assessment:  Pt had good tolerance to session " reporting significant reduction in pain with prone lying and prone press ups. Pt had less sciatic symptoms after prone exercises. Cont to require frequent cuing and demonstration of exercises to improve form and minimize adverse reaction to exercise.     [facility]  Access Code: [email or web address]  Date: 05/08/2025  - Supine Lower Trunk Rotation  - 1 x daily - 30 reps  - Hooklying Single Knee to Chest Stretch  - 1 x daily - 2 sets - 20 seconds hold  - Supine Piriformis Stretch with Foot on Ground  - 1 x daily - 2 sets - 20 seconds hold  - Supine Posterior Pelvic Tilt  - 1 x daily - 15 reps - 5 seconds hold  - Supine Hip Adduction Isometric with Ball  - 1 x daily - 15 reps - 5 seconds hold  - Hooklying Clamshell with Resistance  - 1 x daily - 15 reps - 5 seconds hold  - Seated Sciatic Tensioner  - 1 x daily - 15 reps    Access Code: [email or web address]  URL: [email or web address]  Date: 05/27/2025  Prepared by: Tomás Rollins    Exercises  - Prone Press Up On Elbows  - 1 x daily - 7 x weekly - 3 sets - 10 seconds  - Seated Piriformis Stretch with Trunk Bend  - 2 x daily - 7 x weekly - 2 reps - 20 hold  - Seated Sciatic Tensioner  - 2-3 x daily - 7 x weekly - 10 reps    Post-Treatment Symptoms:    0.5/10    Plan: Follow-up on prone lying and prone press up.       Goals:   Active       PT Problem       PT Goal 1       Start:  05/08/25    Expected End:  07/31/25       1) Pt will report pain levels no greater than 1/10 at worst with activity for less difficulty standing, walking, lifting, and transferring.   2) Pt will display improved pain-free lumbar spine AROM WFL for less difficulty standing, walking, twisting, lifting, transferring.  3) Pt will score <20% impairment on Low Back Disability Questionnaire to indicate minimal low back dysfunction.  4) Pt will improve pain-free bilateral hip and knee strength to at least 4+/5 for all major muscle groups for improved functional strength with transferring, stairs, and general mobility.    5) Pt  will displays improved steadiness on feet with least restrictive AD for improved mobility with transfers and walking.

## 2025-06-04 DIAGNOSIS — E11.9 TYPE 2 DIABETES MELLITUS WITHOUT COMPLICATIONS: ICD-10-CM

## 2025-06-04 RX ORDER — BLOOD-GLUCOSE METER
KIT MISCELLANEOUS
Qty: 100 STRIP | Refills: 4 | Status: SHIPPED | OUTPATIENT
Start: 2025-06-04

## 2025-06-04 NOTE — PROGRESS NOTES
"Physical Therapy Treatment    Patient Name: Daryl Ferrara  MRN: [id number]  Encounter date: 6/5/2025    Time Calculation  Start Time: 0755  Stop Time: 0835  Time Calculation (min): 40 min  PT Therapeutic Procedures Time Entry  Neuromuscular Re-Education Time Entry: 8  Therapeutic Exercise Time Entry: 32    Visit # 4 of 10  Visits/Dates Authorized: MMO - NO AUTH / $30 copay / $1000 OOP not met / 40V ot/pt - 0 used / Availity [id number] / ds 5/7/25 // CPT 67869 Not covered. CPT 24508 requires authorization.    Current Problem:   Problem List Items Addressed This Visit           ICD-10-CM    Low back pain M54.50         Precautions: Precautions  STEADI Fall Risk Score (The score of 4 or more indicates an increased risk of falling): 10   High fall risk, H/o chronic back pain, diabetes, HTN, neuropathy, OA   Hx of x 2 falls December and March.      Subjective   General: pt reports good tolerance to last session and compliance with HEP.  Pt denies any LBP at present.  Pt reports still experiencing difficulty and LBP with getting out of bed (not into).  Pt reports he would like to work on balance, has to grab things t/o the day to prevent falling.    Pre-Treatment Symptoms:    2-3/10    Objective   Pt arrives ambulating with decrease trunk rotation and decreased stride length.       Treatments:      Therapeutic Exercise 14056:   Nu-step x 8 min LE only;  R=4    *Extra time required for eduction and review of exercises on HEP and during session.*-pt brought all former pictures from therapy, including from other providers for similar issues.     -fwd step up, first step R and L 2 x 10 with emphasis on TrA bracing.    -instructed and performed repeated supine to sit using \"log roll technique\" with abdominal bracing.  Pt practiced several times in clinic    NMR:  -standing NBOS 2 x 30\" EO; and EC both unsupported with close CGA  -tandem stance R and L , solid floor, EO   2 x 30\" each    Assessment:  Patient tolerated " treatment well. Patient appears to be working hard in clinic and motivated to decrease pain and restore all functional deficits. Verbal and/or tactile cues given for proper form, and avoidance of substitution patterns with all exercises. All infection control policies followed during this visit. Pt required and provided ample time for instruction and performance of each exercise.  Pt challenged with balance/proprioceptive exercise, wilda with eyes closed, should benefit from continued performance/progression of stability exercises.      [facility]  Access Code: [email or web address]  Date: 05/08/2025  - Supine Lower Trunk Rotation  - 1 x daily - 30 reps  - Hooklying Single Knee to Chest Stretch  - 1 x daily - 2 sets - 20 seconds hold  - Supine Piriformis Stretch with Foot on Ground  - 1 x daily - 2 sets - 20 seconds hold  - Supine Posterior Pelvic Tilt  - 1 x daily - 15 reps - 5 seconds hold  - Supine Hip Adduction Isometric with Ball  - 1 x daily - 15 reps - 5 seconds hold  - Hooklying Clamshell with Resistance  - 1 x daily - 15 reps - 5 seconds hold  - Seated Sciatic Tensioner  - 1 x daily - 15 reps    Access Code: [email or web address]  URL: [email or web address]  Date: 05/27/2025  Prepared by: Tomás Rollins    Exercises  - Prone Press Up On Elbows  - 1 x daily - 7 x weekly - 3 sets - 10 seconds  - Seated Piriformis Stretch with Trunk Bend  - 2 x daily - 7 x weekly - 2 reps - 20 hold  - Seated Sciatic Tensioner  - 2-3 x daily - 7 x weekly - 10 reps    Post-Treatment Symptoms:    0/10      Plan: continue with POC.       Goals:   Active       PT Problem       PT Goal 1       Start:  05/08/25    Expected End:  07/31/25       1) Pt will report pain levels no greater than 1/10 at worst with activity for less difficulty standing, walking, lifting, and transferring.   2) Pt will display improved pain-free lumbar spine AROM WFL for less difficulty standing, walking, twisting, lifting, transferring.  3) Pt will score <20% impairment on Low Back  Disability Questionnaire to indicate minimal low back dysfunction.  4) Pt will improve pain-free bilateral hip and knee strength to at least 4+/5 for all major muscle groups for improved functional strength with transferring, stairs, and general mobility.    5) Pt will displays improved steadiness on feet with least restrictive AD for improved mobility with transfers and walking.

## 2025-06-05 ENCOUNTER — APPOINTMENT (OUTPATIENT)
Dept: SLEEP MEDICINE | Facility: CLINIC | Age: 80
End: 2025-06-05
Payer: MEDICARE

## 2025-06-05 ENCOUNTER — TREATMENT (OUTPATIENT)
Facility: CLINIC | Age: 80
End: 2025-06-05
Payer: MEDICARE

## 2025-06-05 ENCOUNTER — TELEPHONE (OUTPATIENT)
Dept: PRIMARY CARE | Facility: CLINIC | Age: 80
End: 2025-06-05

## 2025-06-05 VITALS
DIASTOLIC BLOOD PRESSURE: 70 MMHG | BODY MASS INDEX: 26.68 KG/M2 | WEIGHT: 170 LBS | SYSTOLIC BLOOD PRESSURE: 142 MMHG | HEIGHT: 67 IN | OXYGEN SATURATION: 96 % | HEART RATE: 74 BPM

## 2025-06-05 DIAGNOSIS — E11.9 TYPE 2 DIABETES MELLITUS WITHOUT COMPLICATIONS: ICD-10-CM

## 2025-06-05 DIAGNOSIS — G47.33 OBSTRUCTIVE SLEEP APNEA, ADULT: Primary | ICD-10-CM

## 2025-06-05 DIAGNOSIS — G47.61 PERIODIC LIMB MOVEMENT DISORDER: ICD-10-CM

## 2025-06-05 DIAGNOSIS — M54.50 CHRONIC LOW BACK PAIN WITHOUT SCIATICA, UNSPECIFIED BACK PAIN LATERALITY: ICD-10-CM

## 2025-06-05 DIAGNOSIS — G89.29 CHRONIC LOW BACK PAIN WITHOUT SCIATICA, UNSPECIFIED BACK PAIN LATERALITY: ICD-10-CM

## 2025-06-05 DIAGNOSIS — Z45.42 ENCOUNTER FOR ADJUSTMENT AND MANAGEMENT OF NEUROSTIMULATOR: ICD-10-CM

## 2025-06-05 PROCEDURE — 97112 NEUROMUSCULAR REEDUCATION: CPT | Mod: GP,CQ

## 2025-06-05 PROCEDURE — 99214 OFFICE O/P EST MOD 30 MIN: CPT | Performed by: INTERNAL MEDICINE

## 2025-06-05 PROCEDURE — 3078F DIAST BP <80 MM HG: CPT | Performed by: INTERNAL MEDICINE

## 2025-06-05 PROCEDURE — 97110 THERAPEUTIC EXERCISES: CPT | Mod: GP,CQ

## 2025-06-05 PROCEDURE — 1126F AMNT PAIN NOTED NONE PRSNT: CPT | Performed by: INTERNAL MEDICINE

## 2025-06-05 PROCEDURE — 95970 ALYS NPGT W/O PRGRMG: CPT | Performed by: INTERNAL MEDICINE

## 2025-06-05 PROCEDURE — 3077F SYST BP >= 140 MM HG: CPT | Performed by: INTERNAL MEDICINE

## 2025-06-05 PROCEDURE — 1036F TOBACCO NON-USER: CPT | Performed by: INTERNAL MEDICINE

## 2025-06-05 PROCEDURE — 1160F RVW MEDS BY RX/DR IN RCRD: CPT | Performed by: INTERNAL MEDICINE

## 2025-06-05 PROCEDURE — 1159F MED LIST DOCD IN RCRD: CPT | Performed by: INTERNAL MEDICINE

## 2025-06-05 RX ORDER — LANCETS
EACH MISCELLANEOUS
Qty: 100 EACH | Refills: 4 | OUTPATIENT
Start: 2025-06-05

## 2025-06-05 RX ORDER — BLOOD-GLUCOSE METER
KIT MISCELLANEOUS
Qty: 100 STRIP | Refills: 4 | OUTPATIENT
Start: 2025-06-05

## 2025-06-05 ASSESSMENT — SLEEP AND FATIGUE QUESTIONNAIRES
HOW LIKELY ARE YOU TO NOD OFF OR FALL ASLEEP WHEN YOU ARE A PASSENGER IN A CAR FOR AN HOUR WITHOUT A BREAK: WOULD NEVER DOZE
HOW LIKELY ARE YOU TO NOD OFF OR FALL ASLEEP WHILE LYING DOWN TO REST IN THE AFTERNOON WHEN CIRCUMSTANCES PERMIT: MODERATE CHANCE OF DOZING
DIFFICULTY_STAYING_ASLEEP: MILD
HOW LIKELY ARE YOU TO NOD OFF OR FALL ASLEEP WHILE SITTING AND TALKING TO SOMEONE: WOULD NEVER DOZE
WORRIED_DISTRESSED_DUE_TO_SLEEP: NOT AT ALL NOTICEABLE
HOW LIKELY ARE YOU TO NOD OFF OR FALL ASLEEP WHILE SITTING AND READING: WOULD NEVER DOZE
HOW LIKELY ARE YOU TO NOD OFF OR FALL ASLEEP IN A CAR, WHILE STOPPED FOR A FEW MINUTES IN TRAFFIC: WOULD NEVER DOZE
HOW LIKELY ARE YOU TO NOD OFF OR FALL ASLEEP WHILE SITTING QUIETLY AFTER LUNCH WITHOUT ALCOHOL: WOULD NEVER DOZE
ESS-CHAD TOTAL SCORE: 3
SLEEP_PROBLEM_INTERFERES_DAILY_ACTIVITIES: A LITTLE
SITING INACTIVE IN A PUBLIC PLACE LIKE A CLASS ROOM OR A MOVIE THEATER: WOULD NEVER DOZE
SLEEP_PROBLEM_NOTICEABLE_TO_OTHERS: A LITTLE
HOW LIKELY ARE YOU TO NOD OFF OR FALL ASLEEP WHILE WATCHING TV: SLIGHT CHANCE OF DOZING
SATISFACTION_WITH_CURRENT_SLEEP_PATTERN: SATISFIED
WAKING_TOO_EARLY: MILD

## 2025-06-05 ASSESSMENT — PAIN SCALES - GENERAL: PAINLEVEL_OUTOF10: 0-NO PAIN

## 2025-06-05 NOTE — PROGRESS NOTES
Patient: Daryl Ferrara    [id number]  : 1945 -- AGE 80 y.o.    Provider: Ko Pearson MD     Location [facility]   Service Date: 2025              [facility]  Inspire Follow up Visit Note    Subjective   Patient ID: Daryl Ferrara is a 80 y.o. male who presents for Sleep Apnea and Inspire - Followup.  HPI    Prior sleep history:  2023: DARYL presents for routine visit to monitor response to Inspire therapy. The patient continues to report significant benefit from the device. DARYL denies any significant side effects or intolerance of therapy.  He reports that he is getting a tingling sensation in his neck and right shoulder any time of the day. He naps in a recliner occasionally. He is currently using 1.6v and does not believe that he can increase therapy. He believes that he may be more rested in the morning, but he believes that his medications are causing him to feel tired. The waveform was checked with adequate rise/fall. Tongue motion was assessed and deemed appropriate.  Implant date 10/20/2021  Prior Sleep studies: Fine tune study 3/11/22, therapeutic amplitude 1.7 v on default (+, -, +). Incoming amplitude (range) :1.6 v ( 1.6- 1.9v). Usage 40 hours per week. Start delay 30 minutes . Pause/delay 20 minutes . Duration of therapy 8 hours .   He had a therapeutic amplitude of 1.7 v during his prior fine tune titration,  2023 : Nights used > 4 hours: 78%. Hours used per night:5.1. Pauses per night: 1.4. Amplitude where the patient utilized therapy the most 1.7 volts. Functional threshold on 2023 1.2 volts     PERIODIC LIMB MOVEMENTS with a PMI 58 on sleep testing. He does have low ferritin low iron stores, denies RLS symptoms. He was prescribed oral iron but stopped due to constipation.    BALANCE ISSUES he is scheduled for an MRI through neurology  2024: Office Visit: Dr. Ko Pearson: Neurology  diagnosed him with neuropathy and flatfoot, undergoing PT.  Order to recheck iron levels for therapeutic amplitude inspire therapy     7/18/2024: Office Visit: Dr. Ko Pearson:  10/17/2024: Office Visit: Dr. Ko Pearson: Daryl denies  issues with his Inspire therapy. He is having issues with his shoulder had prior rotator cuff tear and is asking about an MRI. DEVICE INTERROGATION:  Average usage hours per week: 30 hours determined from device interrogation  CHANGES MADE AT TODAYS VISIT:   Outgoing settings: BIPOLAR CONFIGURATION (DEFAULT) : (+, -, +)     Outgoing amplitude 1.7 volts. Range adjusted to ( 1.5 - 1.7 volts) . Therapeutic amplitude 1.7 volts. Rate 33 Hz. Pulse Width 90 µs     Start delay 30   minutes. Pause/delay 20  minutes . Duration of therapy 8 hours .     The waveform was evaluated. An adequate rise and fall was observed.     2/6/2025: Office Visit: Dr. Ko Pearson:He slipped, fell on ice, lost consciousness and the garbage can fell on him. He has had left leg pain and he was going through a chiropractor. He went to  [facility] and he had imaging and no fracture.     Daryl reports  issues with his Inspire therapy. He states that he cannot feel the sensation of the inspire therapy.     INCOMING SETTINGS:   BIPOLAR CONFIGURATION (DEFAULT) : (+, -, +)     Incoming Amplitude 1.7 volts. Incoming Range ( 1.5 - 1.7 v). Rate 33 Hz. Pulse Width 90 µs.      Start delay 30   minutes. Pause/delay 20  minutes . Duration of therapy 8 hours.         DEVICE INTERROGATION:     Electrode configurations tested:    BIPOLAR CONFIGURATION (DEFAULT) : (+, -, +)     CHANGES MADE AT TODAYS VISIT:   Outgoing settings:      BIPOLAR CONFIGURATION (DEFAULT) : (+, -, +)  Sensation threshold at today's visit was 1.8 volts  Functional threshold at today's visit was 2.2 volts     Outgoing amplitude 2.2 volts. Range adjusted to ( 2.0 - 3.0 volts) .  Rate 33 Hz. Pulse Width 90 µs     Start delay 30   minutes. Pause/delay 30   minutes . Duration of therapy 8 hours .     The waveform was evaluated. An adequate rise and fall was observed.     INCOMING SETTINGS:   BIPOLAR CONFIGURATION (DEFAULT) : (+, -, +)     Incoming Amplitude 1.7 volts. Incoming Range ( 1.5 - 1.7 v). Rate 33 Hz. Pulse Width 90 µs.      Start delay 30   minutes. Pause/delay 20  minutes . Duration of therapy 8 hours.         DEVICE INTERROGATION:     Electrode configurations tested:    BIPOLAR CONFIGURATION (DEFAULT) : (+, -, +)     CHANGES MADE AT TODAYS VISIT:   Outgoing settings:      BIPOLAR CONFIGURATION (DEFAULT) : (+, -, +)  Sensation threshold at today's visit was 1.8 volts  Functional threshold at today's visit was 2.2 volts     Outgoing amplitude 2.2 volts. Range adjusted to ( 2.0 - 3.0 volts) .  Rate 33 Hz. Pulse Width 90 µs     Start delay 30   minutes. Pause/delay 30  minutes . Duration of therapy 8 hours .     The waveform was evaluated. An adequate rise and fall was observed.          Obstructive sleep apnea, adult - Primary G47.33. He had a fall, trash can fell on his chest.  We checked impedances, waveform everything tested normal.  His functional amplitude has increased for an unknown reason.  Current functional 2.2 V recommended that he increase therapy to self titrate by going up 1 level every week to maximal comfort setting.  He will need retitration after he has achieved his most comfortable setting.   He reports higher settings because rubbing of his tongue against his teeth.  May need to change electrode configuration.  Follow-up in 6 weeks  Encounter for adjustment and management of neurostimulator Z45.42. Outgoing settings:  BIPOLAR CONFIGURATION (DEFAULT) : (+, -, +). Sensation threshold at today's visit was 1.8 volts. Functional threshold at today's visit was 2.2 volts   Outgoing amplitude 2.2 volts. Range adjusted to ( 2.0 - 3.0 volts) .  Rate 33 Hz. Pulse Width 90 µs. Start delay 30   minutes. Pause/delay 30  minutes . Duration of therapy 8  "hours .    4/3/2025: Office Visit: Dr. Ko Pearson:CHANGES MADE AT VISIT: Outgoing settings:  BIPOLAR CONFIGURATION (DEFAULT) : (+, -, +) .Outgoing amplitude 1.7 volts. Range ( 1.5 - 1.9 volts) .  Rate 33 Hz. Pulse Width 90 µs. Start delay increased to 45  minutes. Pause/delay increased to 30  minutes . Duration of therapy 8 hours . Therapeutic amplitude 1.7 volts. The waveform was evaluated. An adequate rise and fall was observed.    Current sleep history:    Inspire appointment to evaluate Daryl Ferrara 's Sleep Apnea and Inspire - Followup   and response to therapy.     Daryl denies  issues with his Inspire therapy.   He is sleeping better at the current setting    Inspire remote interrogation:   The Inspire cloud was available for review.    Inspire Remote interrogation via USB or bluetooth:     DEVICE INTERROGATION:  INCOMING SETTINGS:   BIPOLAR CONFIGURATION (DEFAULT) : (+, -, +) . Incoming Amplitude 1.8 volts. Range ( 1.5 - 1.9 v). Rate 33 Hz. Pulse Width 90 µs. Start delay 45   minutes. Pause/delay 30  minutes . Duration of therapy 8 hours.     Outgoing settings:  BIPOLAR CONFIGURATION (DEFAULT) : (+, -, +) .Outgoing Amplitude 1.8 volts. Range ( 1.5 - 1.9 v). Rate 33 Hz. Pulse Width 90 µs. Start delay 45   minutes. Pause/delay 30  minutes . Duration of therapy 8 hours.     Therapeutic amplitude previously was  1.7 volts. The waveform was evaluated. An adequate rise and fall was observed.      Review of Systems  Review of systems negative except as per HPI   [place] Sleepiness Scale: 3   GINO: 5  FOSQ: 34    Objective   /70   Pulse 74   Ht 1.702 m (5' 7\")   Wt 77.1 kg (170 lb)   SpO2 96%   BMI 26.63 kg/m²    PREVIOUS WEIGHTS:  Wt Readings from Last 3 Encounters:   06/05/25 77.1 kg (170 lb)   04/03/25 77.1 kg (170 lb)   04/02/25 78.9 kg (174 lb)     Physical Exam  PHYSICAL EXAM: GENERAL: alert pleasant and cooperative no acute distress  PSYCH EXAM: alert,oriented, in NAD with a full range " "of affect, normal behavior and no psychotic features   Tongue motion was assessed:  tongue protrusion was midline, no fasciculations noted  Tongue protrusion with stimulation was  tongue protrusion was midline and elevation was observed without protrusion past the incisors    Ferritin (ng/mL)   Date Value   08/06/2024 67     Iron (ug/dL)   Date Value   08/06/2024 48     TIBC (ug/dL)   Date Value   08/06/2024 331     % Saturation (%)   Date Value   08/06/2024 15 (L)      No results found for: \"ODU0PAS\"      Assessment/Plan   Problem List Items Addressed This Visit           ICD-10-CM    Obstructive sleep apnea, adult - Primary G47.33    He is doing well at current amplitude which he increased since last visit of 1.8 V.  Needs HSAT to verify therapeutic effectiveness of current setting.  Follow-up in 3 months to review sleep study results         Relevant Orders    Home sleep apnea test (HSAT)    Follow Up In Adult Sleep Medicine    Encounter for adjustment and management of neurostimulator Z45.42    Outgoing settings:  BIPOLAR CONFIGURATION (DEFAULT) : (+, -, +) .Outgoing Amplitude 1.8 volts. Range ( 1.5 - 1.9 v). Rate 33 Hz. Pulse Width 90 µs. Start delay 45   minutes. Pause/delay 30  minutes . Duration of therapy 8 hours.          Relevant Orders    Home sleep apnea test (HSAT)    Follow Up In Adult Sleep Medicine    Periodic limb movement disorder G47.61    Revaluate after sleep study             "

## 2025-06-05 NOTE — TELEPHONE ENCOUNTER
Rx Refill Request Telephone Encounter    Name:  Daryl Ferrara  :  [id number]  Medication Name:  Freestyle test strips/ lancets            Specific Pharmacy location:  [facility], [address]  Date of last appointment:  25  Date of next appointment:  10-2-25  Best number to reach patient:

## 2025-06-05 NOTE — ASSESSMENT & PLAN NOTE
He is doing well at current amplitude which he increased since last visit of 1.8 V.  Needs HSAT to verify therapeutic effectiveness of current setting.  Follow-up in 3 months to review sleep study results

## 2025-06-05 NOTE — PATIENT INSTRUCTIONS
For your home sleep study to get useful information please follow these steps:  Turn on you Inspire device before bed, check the remote to make sure Inspire therapy is on (light on remote is green, dimming and brightening)  Put on the Home sleep study device and go to bed  In the morning take off the home sleep study device upon waking  Turn off the Inspire device. Check the remote to make sure it is turned off (light on remote is white).  It is important to follow these steps to get a good test result because the belts from the home sleep test device can interfere with your remote and prevent it from turning on.   It is always recommended to turn on your Inspire therapy before you put on the home sleep study device     Thank you for coming to the Sleep Medicine Clinic today! Your sleep medicine provider today was: Ko Pearson MD Below is a summary of your treatment plan, patient education, other important information, and our contact numbers.      TREATMENT PLAN     Follow-up Appointment:   Follow-up in 3 months    PATIENT EDUCATION     You can also go to the following EDUCATION WEBSITES for further information:   American Academy of Sleep Medicine http://sleepeducation.org  National Sleep Foundation: https://sleepfoundation.org  American Sleep Apnea Association: https://www.sleepapnea.org (for patients with sleep apnea)  Narcolepsy Network: https://www.narcolepsynetwork.org (for patients with narcolepsy)  [employer]: https://www.wakeupnarcolepsy.org (for patients with narcolepsy)  Hypersomnia Foundation: https://www.hypersomniafoundation.org (for patients with idiopathic hypersomnia)  RLS foundation: https://www.rls.org (for patients with restless leg syndrome)    IMPORTANT INFORMATION     Call 911 for medical emergencies.  Our offices are generally open from Monday-Friday, 8 am - 5 pm.   There are no supporting services by either the sleep doctors or their staff on weekends and Holidays, or after 5  PM on weekdays.   If you need to get in touch with me, you may either call my office number or you can use [employer].  If a referral for a test, for CPAP, or for another specialist was made, and you have not heard about scheduling this within a week, please call scheduling at [phone] ([phone]).  If you are unable to make your appointment for clinic or an overnight study, kindly call the office or sleep testing center at least 48 hours in advance to cancel and reschedule.  If you are on CPAP, please bring your device's card and/or the device to each clinic appointment.   In case of problems with PAP machine or mask interface, please contact your DME (Durable Medical Equipment) company first. DME is the company who provides you the machine and/or PAP supplies.       PRESCRIPTIONS     We require 7 days advanced notice for prescription refills. If we do not receive the request in this time, we cannot guarantee that your medication will be refilled in time.    IMPORTANT PHONE NUMBERS     Sleep Medicine Clinic Fax: [phone]  Appointments (for Adult Sleep Clinic): [phone] - option 2  Appointments (For Sleep Studies): [phone] - option 3  Behavioral Sleep Medicine: [phone]  Sleep Surgery: [phone]  Nutrition Service: [phone]  Weight management clinics with endocrinology: [phone]  Bariatric Services: [phone] (includes weight loss medications and weight loss surgery)  [facility]: [phone] (offers holistic approaches to weight management)  ENT (Otolaryngology): [phone]  Headache Clinic (Neurology): [phone]  Neurology: [phone]  Psychiatry: [phone]  Pulmonary Function Testing (PFT) Center: [phone]  Pulmonary Medicine: [phone]  [employer] ([employer]): [phone]      OUR SLEEP TESTING LOCATIONS     Our team will contact you to schedule your sleep study, however, you can contact us as follow:  Main Phone Line (sleep  "study scheduling only): [phone]), option 3  Adult Only Locations:  [facility] (18 years and older) at [place]: [address]  After hours line: [phone]    [place] (18 years and older): [address], 2nd floor   [place] (18 years and older): [address] [facility]; 4th floor  After hours line: [phone]  Adult and Pediatric Locations  [facility] (6 years and older): [address] - 4th floor ([address]) After hours line: [phone]   [facility] (6 years and older): [address]; [address]; [address]   [place] (6 years and older): [address], [address]  After hours line: [phone]  [facility] (Main campus: All ages): [facility], 6th floor. After hours line: [phone]   [place] (5 years and older; younger considered on case-by-case basis): [address]; [address], [address]. Scheduling  After hours line: [phone]   [place] (13 years and older) in [place]: [address], 2nd floor  After hours line: [phone]   [place] (13 year and older): [address]  After hours line: [phone]       CONTACTING YOUR SLEEP MEDICINE PROVIDER AND SLEEP TEAM      For issues with your machine or mask interface, please call your DME provider first. DME stands for durable medical company. DME is the company who provides you the machine and/or PAP supplies / accessories.   To schedule, cancel, or reschedule SLEEP STUDY APPOINTMENTS, please call the Main Phone Line at [phone] - option 3.   To schedule, cancel, or reschedule CLINIC APPOINTMENTS, you can do it in \"MyChart\", call [phone] (direct line of [place] clinic), call [phone] (direct line of [facility] clinic), or call the Main Phone Line at [phone] - option 2  For CLINICAL QUESTIONS or MEDICATION REFILLS, please call direct line for Adult Sleep " "Nurses at [phone].   Lastly, you can also send a message directly to your provider through \"My Chart\", which is the email service through your  Records Account: [email or web address]       Here at [facility], we wish you a restful sleep!   "

## 2025-06-05 NOTE — ASSESSMENT & PLAN NOTE
Outgoing settings:  BIPOLAR CONFIGURATION (DEFAULT) : (+, -, +) .Outgoing Amplitude 1.8 volts. Range ( 1.5 - 1.9 v). Rate 33 Hz. Pulse Width 90 µs. Start delay 45   minutes. Pause/delay 30  minutes . Duration of therapy 8 hours.

## 2025-06-10 ENCOUNTER — APPOINTMENT (OUTPATIENT)
Facility: CLINIC | Age: 80
End: 2025-06-10
Payer: MEDICARE

## 2025-06-12 ENCOUNTER — APPOINTMENT (OUTPATIENT)
Facility: CLINIC | Age: 80
End: 2025-06-12
Payer: MEDICARE

## 2025-06-12 DIAGNOSIS — M54.50 CHRONIC LOW BACK PAIN WITHOUT SCIATICA, UNSPECIFIED BACK PAIN LATERALITY: ICD-10-CM

## 2025-06-12 DIAGNOSIS — G89.29 CHRONIC LOW BACK PAIN WITHOUT SCIATICA, UNSPECIFIED BACK PAIN LATERALITY: ICD-10-CM

## 2025-06-12 PROCEDURE — 97112 NEUROMUSCULAR REEDUCATION: CPT | Mod: GP

## 2025-06-12 PROCEDURE — 97110 THERAPEUTIC EXERCISES: CPT | Mod: GP

## 2025-06-12 ASSESSMENT — PAIN SCALES - GENERAL: PAINLEVEL_OUTOF10: 0 - NO PAIN

## 2025-06-12 ASSESSMENT — PAIN - FUNCTIONAL ASSESSMENT: PAIN_FUNCTIONAL_ASSESSMENT: 0-10

## 2025-06-12 NOTE — PROGRESS NOTES
"Physical Therapy Treatment    Patient Name: Daryl Ferrara  MRN: [id number]  Encounter date: 6/12/2025    Time Calculation  Start Time: 1630  Stop Time: 1655  Time Calculation (min): 25 min  PT Therapeutic Procedures Time Entry  Therapeutic Exercise Time Entry: 10  Neuromuscular Re-Education Time Entry: 15    Visit # 5 of 10  Visits/Dates Authorized: MMO - NO AUTH / $30 copay / $1000 OOP not met / 40V ot/pt - 0 used / Availity [id number] / ds 5/7/25 // CPT 78653 Not covered. CPT 67167 requires authorization.    Current Problem:   Problem List Items Addressed This Visit           ICD-10-CM    Low back pain M54.50         Precautions: Precautions  STEADI Fall Risk Score (The score of 4 or more indicates an increased risk of falling): 10   High fall risk, H/o chronic back pain, diabetes, HTN, neuropathy, OA   Hx of x 2 falls December and March.      Subjective   General: Pt states his back feels good. He wants to keep getting his balance better. Had a debridement of his big toe two days ago.     Pre-Treatment Symptoms:   Pain Assessment: 0-10  0-10 (Numeric) Pain Score: 0 - No pain2-3/10    Objective        Treatments:      Therapeutic Exercise 39090:   Nu-step x 8 min LE only;  R=4  Sit to stand no Ue's 2x10  Leg curl 55# 2x10  Knee ext 10# x15, alt iso 2x10  Seated pallof press red 2x10    NMR:  -standing NBOS 2 x 30\" EO; and EC both unsupported with close CGA  -tandem stance R and L , solid floor, EO   2 x 30\" each  AP rockerboard  Toe taps 4 inch x 20  Walking with head turns L/R, up/down  Fwd/backward walk, side step 10 feet x 4 each  Resisted gait lateral L/R 5# x 3 each      Assessment:  Pt reporting improved low back pain. Worked on standing tolerance, balance, and LE/core strengthening.      HEP  Access Code: [email or web address]  Date: 05/08/2025  - Supine Lower Trunk Rotation  - 1 x daily - 30 reps  - Hooklying Single Knee to Chest Stretch  - 1 x daily - 2 sets - 20 seconds hold  - Supine Piriformis Stretch with " Foot on Ground  - 1 x daily - 2 sets - 20 seconds hold  - Supine Posterior Pelvic Tilt  - 1 x daily - 15 reps - 5 seconds hold  - Supine Hip Adduction Isometric with Ball  - 1 x daily - 15 reps - 5 seconds hold  - Hooklying Clamshell with Resistance  - 1 x daily - 15 reps - 5 seconds hold  - Seated Sciatic Tensioner  - 1 x daily - 15 reps    Access Code: [email or web address]  URL: [email or web address]  Date: 05/27/2025  Prepared by: Tomás Rollins    Exercises  - Prone Press Up On Elbows  - 1 x daily - 7 x weekly - 3 sets - 10 seconds  - Seated Piriformis Stretch with Trunk Bend  - 2 x daily - 7 x weekly - 2 reps - 20 hold  - Seated Sciatic Tensioner  - 2-3 x daily - 7 x weekly - 10 reps    Post-Treatment Symptoms:   Response to Interventions: No change in pain0/10      Plan: continue with POC.       Goals:   Active       PT Problem       PT Goal 1       Start:  05/08/25    Expected End:  07/31/25       1) Pt will report pain levels no greater than 1/10 at worst with activity for less difficulty standing, walking, lifting, and transferring.   2) Pt will display improved pain-free lumbar spine AROM WFL for less difficulty standing, walking, twisting, lifting, transferring.  3) Pt will score <20% impairment on Low Back Disability Questionnaire to indicate minimal low back dysfunction.  4) Pt will improve pain-free bilateral hip and knee strength to at least 4+/5 for all major muscle groups for improved functional strength with transferring, stairs, and general mobility.    5) Pt will displays improved steadiness on feet with least restrictive AD for improved mobility with transfers and walking.

## 2025-06-17 ENCOUNTER — TREATMENT (OUTPATIENT)
Facility: CLINIC | Age: 80
End: 2025-06-17
Payer: MEDICARE

## 2025-06-17 DIAGNOSIS — M54.50 CHRONIC LOW BACK PAIN WITHOUT SCIATICA, UNSPECIFIED BACK PAIN LATERALITY: ICD-10-CM

## 2025-06-17 DIAGNOSIS — G89.29 CHRONIC LOW BACK PAIN WITHOUT SCIATICA, UNSPECIFIED BACK PAIN LATERALITY: ICD-10-CM

## 2025-06-17 PROCEDURE — 97112 NEUROMUSCULAR REEDUCATION: CPT | Mod: GP,CQ

## 2025-06-17 PROCEDURE — 97110 THERAPEUTIC EXERCISES: CPT | Mod: GP,CQ

## 2025-06-17 ASSESSMENT — PAIN - FUNCTIONAL ASSESSMENT: PAIN_FUNCTIONAL_ASSESSMENT: 0-10

## 2025-06-17 ASSESSMENT — PAIN SCALES - GENERAL: PAINLEVEL_OUTOF10: 0 - NO PAIN

## 2025-06-17 NOTE — PROGRESS NOTES
"Physical Therapy Treatment    Patient Name: Daryl Ferrara  MRN: [id number]  Encounter date: 6/17/2025 PT Received On: 06/17/25  Time Calculation  Start Time: 1025  Stop Time: 1108  Time Calculation (min): 43 min  PT Therapeutic Procedures Time Entry  Therapeutic Exercise Time Entry: 23  Neuromuscular Re-Education Time Entry: 20    Visit # 6 of 10  Visits/Dates Authorized: MMO - NO AUTH / $30 copay / $1000 OOP not met / 40V ot/pt - 0 used / Availity [id number] / ds 5/7/25 // CPT 85111 Not covered. CPT 05868 requires authorization.    Current Problem:   Problem List Items Addressed This Visit           ICD-10-CM    Low back pain M54.50     Precautions: Precautions  STEADI Fall Risk Score (The score of 4 or more indicates an increased risk of falling): 10   High fall risk, H/o chronic back pain, diabetes, HTN, neuropathy, OA   Hx of x 2 falls December and March.      Subjective   General: Pt states his back feels good.Challenged last session with balance work but was pleased to work on it.  HEP going well    Pre-Treatment Symptoms:   Pain Assessment: 0-10  0-10 (Numeric) Pain Score: 0 - No pain2-3/10    Objective   Using a cane into department, has gait deviations without cane     Treatments:      Therapeutic Exercise 61867:   Nu-step x 8 min LE only;  R=4  Sit to stand no Ue's 5x super set with farmers carry 2x  Leg curl 55# 2x10  Knee ext 10# x15, alt iso 2x10  Seated pallof press red 2x10    NMR:  -standing NBOS 2 x 30\" EO; and EC both unsupported with close CGA  -tandem stance R and L , solid floor, EO   2 x 30\" each  AP rockerboard  Toe taps 4 inch x 20  Walking with head turns L/R, up/down  Fwd/backward walk, side step 10 feet x 4 each  Resisted gait lateral L/R 5# x 3 each  Tandem on step nods nos and ec  Walk with start stop 360 180 bkwd and fast and slow    Assessment:  Pt reporting improved low back pain. Worked on standing tolerance, balance, and LE/core strengthening. Patient challenged with head " turns for balance activity. Gives good effort and wants to feel more stable     HEP  Access Code: [email or web address]  Date: 05/08/2025  - Supine Lower Trunk Rotation  - 1 x daily - 30 reps  - Hooklying Single Knee to Chest Stretch  - 1 x daily - 2 sets - 20 seconds hold  - Supine Piriformis Stretch with Foot on Ground  - 1 x daily - 2 sets - 20 seconds hold  - Supine Posterior Pelvic Tilt  - 1 x daily - 15 reps - 5 seconds hold  - Supine Hip Adduction Isometric with Ball  - 1 x daily - 15 reps - 5 seconds hold  - Hooklying Clamshell with Resistance  - 1 x daily - 15 reps - 5 seconds hold  - Seated Sciatic Tensioner  - 1 x daily - 15 reps    Access Code: [email or web address]  URL: [email or web address]  Date: 05/27/2025  Prepared by: Tomás Rollins    Exercises  - Prone Press Up On Elbows  - 1 x daily - 7 x weekly - 3 sets - 10 seconds  - Seated Piriformis Stretch with Trunk Bend  - 2 x daily - 7 x weekly - 2 reps - 20 hold  - Seated Sciatic Tensioner  - 2-3 x daily - 7 x weekly - 10 reps    Post-Treatment Symptoms:   Response to Interventions: No change in pain    Plan: continue with POC.       Goals:   Active       PT Problem       PT Goal 1       Start:  05/08/25    Expected End:  07/31/25       1) Pt will report pain levels no greater than 1/10 at worst with activity for less difficulty standing, walking, lifting, and transferring.   2) Pt will display improved pain-free lumbar spine AROM WFL for less difficulty standing, walking, twisting, lifting, transferring.  3) Pt will score <20% impairment on Low Back Disability Questionnaire to indicate minimal low back dysfunction.  4) Pt will improve pain-free bilateral hip and knee strength to at least 4+/5 for all major muscle groups for improved functional strength with transferring, stairs, and general mobility.    5) Pt will displays improved steadiness on feet with least restrictive AD for improved mobility with transfers and walking.

## 2025-06-19 ENCOUNTER — APPOINTMENT (OUTPATIENT)
Dept: DERMATOLOGY | Facility: CLINIC | Age: 80
End: 2025-06-19
Payer: MEDICARE

## 2025-06-24 ENCOUNTER — TREATMENT (OUTPATIENT)
Facility: CLINIC | Age: 80
End: 2025-06-24
Payer: MEDICARE

## 2025-06-24 DIAGNOSIS — G89.29 CHRONIC LOW BACK PAIN WITHOUT SCIATICA, UNSPECIFIED BACK PAIN LATERALITY: ICD-10-CM

## 2025-06-24 DIAGNOSIS — M54.50 CHRONIC LOW BACK PAIN WITHOUT SCIATICA, UNSPECIFIED BACK PAIN LATERALITY: ICD-10-CM

## 2025-06-24 PROCEDURE — 97112 NEUROMUSCULAR REEDUCATION: CPT | Mod: GP

## 2025-06-24 PROCEDURE — 97110 THERAPEUTIC EXERCISES: CPT | Mod: GP

## 2025-06-24 NOTE — PROGRESS NOTES
"Physical Therapy Treatment    Patient Name: Daryl Ferrara  MRN: [id number]  Encounter date: 6/24/2025    Time Calculation  Start Time: 1019  Stop Time: 1057  Time Calculation (min): 38 min  PT Therapeutic Procedures Time Entry  Therapeutic Exercise Time Entry: 23  Neuromuscular Re-Education Time Entry: 15    Visit # 7 of 10  Visits/Dates Authorized: MMO - NO AUTH / $30 copay / $1000 OOP not met / 40V ot/pt - 0 used / Availity [id number] / ds 5/7/25 // CPT 00196 Not covered. CPT 72780 requires authorization.    Current Problem:   Problem List Items Addressed This Visit           ICD-10-CM    Low back pain M54.50     Precautions: Precautions  STEADI Fall Risk Score (The score of 4 or more indicates an increased risk of falling): 10   High fall risk, H/o chronic back pain, diabetes, HTN, neuropathy, OA   Hx of x 2 falls December and March.      Subjective   General: Pt denies pain but continues to notice weakness in the L knee.     Pre-Treatment Symptoms:    2-3/10    Objective   Reduced safety awareness and mild impulsive but pt is aware of cog deficits.     Treatments:      Therapeutic Exercise 25079:   Nu-step x 8 min LE only;  R=5  Sit to stand no Ue's 3 sets x 10 reps  Leg curl 55# 2x10  Knee ext 5# ea (10# total) performed 3 sets x 10 reps    Alternating step ups R/L x 10 reps ea x 3 sets    NMR 43823:  Firm surface mult reps/sets   Normal and NBOS with EO and EC   Head turns   Up/down  Foam surgace mult reps/sets   Normal and NBOS with EO and EC  Head turns   Up/down  Alt step taps    *UE support as needed    -standing NBOS 2 x 30\" EO; and EC both unsupported with close CGA  -tandem stance R and L , solid floor, EO   2 x 30\" each  AP rockerboard  Toe taps 4 inch x 20  Walking with head turns L/R, up/down  Fwd/backward walk, side step 10 feet x 4 each  Resisted gait lateral L/R 5# x 3 each  Tandem on step nods nos and ec  Walk with start stop 360 180 bkwd and fast and slow    Assessment:   Pt had good " tolerance to session demonstrating good form and technique requiring supervision and cues throughout session for improved performance and safety. Pt reports brief symptoms of sciatica with balance training on foam during session with interventions and reduced with cues for core and glute engagement. Reduced symptoms post session.      Worked on standing tolerance, balance, and LE/core strengthening. Patient challenged with head turns for balance activity. Gives good effort and wants to feel more stable     HEP  Access Code: [email or web address]  Date: 05/08/2025  - Supine Lower Trunk Rotation  - 1 x daily - 30 reps  - Hooklying Single Knee to Chest Stretch  - 1 x daily - 2 sets - 20 seconds hold  - Supine Piriformis Stretch with Foot on Ground  - 1 x daily - 2 sets - 20 seconds hold  - Supine Posterior Pelvic Tilt  - 1 x daily - 15 reps - 5 seconds hold  - Supine Hip Adduction Isometric with Ball  - 1 x daily - 15 reps - 5 seconds hold  - Hooklying Clamshell with Resistance  - 1 x daily - 15 reps - 5 seconds hold  - Seated Sciatic Tensioner  - 1 x daily - 15 reps    Access Code: [email or web address]  URL: [email or web address]  Date: 05/27/2025  Prepared by: Tomás Rollins    Exercises  - Prone Press Up On Elbows  - 1 x daily - 7 x weekly - 3 sets - 10 seconds  - Seated Piriformis Stretch with Trunk Bend  - 2 x daily - 7 x weekly - 2 reps - 20 hold  - Seated Sciatic Tensioner  - 2-3 x daily - 7 x weekly - 10 reps    Post-Treatment Symptoms:    Better.    Plan: continue with POC.       Goals:   Active       PT Problem       PT Goal 1       Start:  05/08/25    Expected End:  07/31/25       1) Pt will report pain levels no greater than 1/10 at worst with activity for less difficulty standing, walking, lifting, and transferring.   2) Pt will display improved pain-free lumbar spine AROM WFL for less difficulty standing, walking, twisting, lifting, transferring.  3) Pt will score <20% impairment on Low Back Disability Questionnaire to  indicate minimal low back dysfunction.  4) Pt will improve pain-free bilateral hip and knee strength to at least 4+/5 for all major muscle groups for improved functional strength with transferring, stairs, and general mobility.    5) Pt will displays improved steadiness on feet with least restrictive AD for improved mobility with transfers and walking.

## 2025-07-01 ENCOUNTER — TREATMENT (OUTPATIENT)
Facility: CLINIC | Age: 80
End: 2025-07-01
Payer: MEDICARE

## 2025-07-01 DIAGNOSIS — G89.29 CHRONIC LOW BACK PAIN WITHOUT SCIATICA, UNSPECIFIED BACK PAIN LATERALITY: ICD-10-CM

## 2025-07-01 DIAGNOSIS — M54.50 CHRONIC LOW BACK PAIN WITHOUT SCIATICA, UNSPECIFIED BACK PAIN LATERALITY: ICD-10-CM

## 2025-07-01 PROCEDURE — 97112 NEUROMUSCULAR REEDUCATION: CPT | Mod: GP | Performed by: PHYSICAL THERAPIST

## 2025-07-01 PROCEDURE — 97110 THERAPEUTIC EXERCISES: CPT | Mod: GP | Performed by: PHYSICAL THERAPIST

## 2025-07-01 ASSESSMENT — PAIN - FUNCTIONAL ASSESSMENT: PAIN_FUNCTIONAL_ASSESSMENT: 0-10

## 2025-07-01 ASSESSMENT — PAIN SCALES - GENERAL: PAINLEVEL_OUTOF10: 0 - NO PAIN

## 2025-07-01 NOTE — PROGRESS NOTES
Physical Therapy Treatment    Patient Name: Daryl Ferrara  MRN: [id number]  Encounter date: 7/1/2025    Time Calculation  Start Time: 1011  Stop Time: 1057  Time Calculation (min): 46 min  PT Therapeutic Procedures Time Entry  Therapeutic Exercise Time Entry: 14  Neuromuscular Re-Education Time Entry: 31    Visit # 8 of 10  Visits/Dates Authorized: MMO - NO AUTH / $30 copay / $1000 OOP not met / 40V ot/pt - 0 used / Availity [id number] / ds 5/7/25 // CPT 83309 Not covered. CPT 09630 requires authorization.    Current Problem:   Problem List Items Addressed This Visit           ICD-10-CM    Low back pain M54.50     Precautions: Precautions  STEADI Fall Risk Score (The score of 4 or more indicates an increased risk of falling): 10   High fall risk, H/o chronic back pain, diabetes, HTN, neuropathy, OA   Hx of x 2 falls December and March.      Subjective   General: Patient continues to notice weakness in the L knee and difficulty with balance. Patient reports compliance with HEP.    Pre-Treatment Symptoms:   Pain Assessment: 0-10  0-10 (Numeric) Pain Score: 0 - No pain2-3/10    Objective   Reduced safety awareness and mild impulsive but pt is aware of cog deficits.     Treatments:      Therapeutic Exercise 21793:   Nu-step x 8 min LE only;  R=5  Sit to stand no Ue's 3 sets x 10 reps  Leg curl 55# 2x12  Knee ext 5# ea (10# total) performed 3 sets x 10 reps    Alternating step ups R/L x 10 reps ea x 3 sets    NMR 11896:  Resisted gait lateral L/R and fwd/backwd 7.5# x 3 (CGA)  On foam feet together light perturbations 2x60''   On foam feet together with shoulder flexion 2x10   Foam surface mult reps/sets 2x 30'' each of   NBOS with EO and EC  Head turns   Up/down  Alt step taps on foam   Walking with head turns L/R, up/down    *UE support as needed with CGA/SBA for safety    Deferred:  AP rockerboard  Toe taps 4 inch x 20  Fwd/backward walk, side step 10 feet x 4 each  Tandem on step nods nos and ec  Walk with  start stop 360 180 bkwd and fast and slow    Assessment:   Patient continues to be challenged by balance exercises especially with EC and head turns. Patient does require CGA/SBA for safety where no UE support is used. Cues provided for proper form intermittently through out session.      HEP  Access Code: [email or web address]  Date: 05/08/2025  - Supine Lower Trunk Rotation  - 1 x daily - 30 reps  - Hooklying Single Knee to Chest Stretch  - 1 x daily - 2 sets - 20 seconds hold  - Supine Piriformis Stretch with Foot on Ground  - 1 x daily - 2 sets - 20 seconds hold  - Supine Posterior Pelvic Tilt  - 1 x daily - 15 reps - 5 seconds hold  - Supine Hip Adduction Isometric with Ball  - 1 x daily - 15 reps - 5 seconds hold  - Hooklying Clamshell with Resistance  - 1 x daily - 15 reps - 5 seconds hold  - Seated Sciatic Tensioner  - 1 x daily - 15 reps    Access Code: [email or web address]  URL: [email or web address]  Date: 05/27/2025  Prepared by: Tomás Rollins    Exercises  - Prone Press Up On Elbows  - 1 x daily - 7 x weekly - 3 sets - 10 seconds  - Seated Piriformis Stretch with Trunk Bend  - 2 x daily - 7 x weekly - 2 reps - 20 hold  - Seated Sciatic Tensioner  - 2-3 x daily - 7 x weekly - 10 reps    Post-Treatment Symptoms:   Response to Interventions: No change in painBetter.    Plan: continue with POC.       Goals:   Active       PT Problem       PT Goal 1       Start:  05/08/25    Expected End:  07/31/25       1) Pt will report pain levels no greater than 1/10 at worst with activity for less difficulty standing, walking, lifting, and transferring.   2) Pt will display improved pain-free lumbar spine AROM WFL for less difficulty standing, walking, twisting, lifting, transferring.  3) Pt will score <20% impairment on Low Back Disability Questionnaire to indicate minimal low back dysfunction.  4) Pt will improve pain-free bilateral hip and knee strength to at least 4+/5 for all major muscle groups for improved functional strength with  transferring, stairs, and general mobility.    5) Pt will displays improved steadiness on feet with least restrictive AD for improved mobility with transfers and walking.

## 2025-07-08 ENCOUNTER — TREATMENT (OUTPATIENT)
Facility: CLINIC | Age: 80
End: 2025-07-08
Payer: MEDICARE

## 2025-07-08 DIAGNOSIS — M54.50 CHRONIC LOW BACK PAIN WITHOUT SCIATICA, UNSPECIFIED BACK PAIN LATERALITY: ICD-10-CM

## 2025-07-08 DIAGNOSIS — G89.29 CHRONIC LOW BACK PAIN WITHOUT SCIATICA, UNSPECIFIED BACK PAIN LATERALITY: ICD-10-CM

## 2025-07-08 PROCEDURE — 97112 NEUROMUSCULAR REEDUCATION: CPT | Mod: GP,CQ

## 2025-07-08 PROCEDURE — 97110 THERAPEUTIC EXERCISES: CPT | Mod: GP,CQ

## 2025-07-08 ASSESSMENT — PAIN SCALES - GENERAL: PAINLEVEL_OUTOF10: 0 - NO PAIN

## 2025-07-08 ASSESSMENT — PAIN - FUNCTIONAL ASSESSMENT: PAIN_FUNCTIONAL_ASSESSMENT: 0-10

## 2025-07-08 NOTE — PROGRESS NOTES
Physical Therapy Treatment    Patient Name: Daryl Ferrara  MRN: [id number]  Encounter date: 7/8/2025 PT Received On: 07/08/25  Time Calculation  Start Time: 1045  Stop Time: 1130  Time Calculation (min): 45 min  PT Therapeutic Procedures Time Entry  Therapeutic Exercise Time Entry: 20  Neuromuscular Re-Education Time Entry: 25    Visit # 9 of 10  Visits/Dates Authorized: MMO - NO AUTH / $30 copay / $1000 OOP not met / 40V ot/pt - 0 used / Availity [id number] / ds 5/7/25 // CPT 00804 Not covered. CPT 11689 requires authorization.    Current Problem:   Problem List Items Addressed This Visit           ICD-10-CM    Low back pain M54.50       Precautions: Precautions  STEADI Fall Risk Score (The score of 4 or more indicates an increased risk of falling): 10   High fall risk, H/o chronic back pain, diabetes, HTN, neuropathy, OA   Hx of x 2 falls December and March.      Subjective   General: Patient continues to notice weakness in the L knee and difficulty with balance. Forgot cane today so walking more cautiously    Pre-Treatment Symptoms:   Pain Assessment: 0-10  0-10 (Numeric) Pain Score: 0 - No pain    Objective   Reduced safety awareness and mild impulsive but pt is aware of cog deficits.     Treatments:      Therapeutic Exercise 83213:   Nu-step x 8 min LE only;    Sit to stand no Ue's 3 sets x 10 reps  Leg curl 55# 2x12  Leg press #30 2x10  Knee ext 5# ea (10# total) performed 3 sets x 10 reps    Alternating step ups R/L x 10 reps ea x 3 sets    NMR 30527:  Resisted gait lateral L/R and fwd/backwd 7.5# x 3 (CGA)  On foam feet together light perturbations 2x60''   On foam feet together with shoulder flexion 2x10   Foam surface mult reps/sets 2x 30'' each of   NBOS with EO and EC  Head turns   Up/down  Alt step taps on foam   Walking with head turns L/R, up/down  Tandem stance no UE  30 sec 3x    *UE support as needed with CGA/SBA for safety    Deferred:  AP rockerboard  Toe taps 4 inch x 20  Fwd/backward  walk, side step 10 feet x 4 each  Walk with start stop 360 180 bkwd and fast and slow    Assessment:   Patient continues to be challenged by balance exercises especially with EC and head turns. Added leg press for LE strength this date no knee pain noted. Fatigued after resisted gait this date         HEP  Access Code: [email or web address]  Date: 05/08/2025  - Supine Lower Trunk Rotation  - 1 x daily - 30 reps  - Hooklying Single Knee to Chest Stretch  - 1 x daily - 2 sets - 20 seconds hold  - Supine Piriformis Stretch with Foot on Ground  - 1 x daily - 2 sets - 20 seconds hold  - Supine Posterior Pelvic Tilt  - 1 x daily - 15 reps - 5 seconds hold  - Supine Hip Adduction Isometric with Ball  - 1 x daily - 15 reps - 5 seconds hold  - Hooklying Clamshell with Resistance  - 1 x daily - 15 reps - 5 seconds hold  - Seated Sciatic Tensioner  - 1 x daily - 15 reps    Access Code: [email or web address]  URL: [email or web address]  Date: 05/27/2025  Prepared by: Tomás Rollins    Exercises  - Prone Press Up On Elbows  - 1 x daily - 7 x weekly - 3 sets - 10 seconds  - Seated Piriformis Stretch with Trunk Bend  - 2 x daily - 7 x weekly - 2 reps - 20 hold  - Seated Sciatic Tensioner  - 2-3 x daily - 7 x weekly - 10 reps    Post-Treatment Symptoms:   Response to Interventions: No change in pain  Plan: continue with POC.       Goals:   Active       PT Problem       PT Goal 1       Start:  05/08/25    Expected End:  07/31/25       1) Pt will report pain levels no greater than 1/10 at worst with activity for less difficulty standing, walking, lifting, and transferring.   2) Pt will display improved pain-free lumbar spine AROM WFL for less difficulty standing, walking, twisting, lifting, transferring.  3) Pt will score <20% impairment on Low Back Disability Questionnaire to indicate minimal low back dysfunction.  4) Pt will improve pain-free bilateral hip and knee strength to at least 4+/5 for all major muscle groups for improved functional strength  with transferring, stairs, and general mobility.    5) Pt will displays improved steadiness on feet with least restrictive AD for improved mobility with transfers and walking.

## 2025-07-18 ENCOUNTER — APPOINTMENT (OUTPATIENT)
Facility: CLINIC | Age: 80
End: 2025-07-18
Payer: MEDICARE

## 2025-07-24 ENCOUNTER — TREATMENT (OUTPATIENT)
Facility: CLINIC | Age: 80
End: 2025-07-24
Payer: MEDICARE

## 2025-07-24 DIAGNOSIS — M54.50 CHRONIC LOW BACK PAIN WITHOUT SCIATICA, UNSPECIFIED BACK PAIN LATERALITY: ICD-10-CM

## 2025-07-24 DIAGNOSIS — G89.29 CHRONIC LOW BACK PAIN WITHOUT SCIATICA, UNSPECIFIED BACK PAIN LATERALITY: ICD-10-CM

## 2025-07-24 PROCEDURE — 97110 THERAPEUTIC EXERCISES: CPT | Mod: GP

## 2025-07-24 ASSESSMENT — PAIN - FUNCTIONAL ASSESSMENT: PAIN_FUNCTIONAL_ASSESSMENT: 0-10

## 2025-07-24 ASSESSMENT — PAIN SCALES - GENERAL: PAINLEVEL_OUTOF10: 1

## 2025-07-24 NOTE — PROGRESS NOTES
Physical Therapy Treatment    Patient Name: Daryl Ferrara  MRN: [id number]  Encounter date: 7/24/2025 PT Received On:  (PROGRESS NOTE)  Time Calculation  Start Time: 1015  Stop Time: 1100  Time Calculation (min): 45 min  PT Therapeutic Procedures Time Entry  Therapeutic Exercise Time Entry: 45    Visit # 10 of 20  Visits/Dates Authorized: MMO - NO AUTH / $30 copay / $1000 OOP not met / 40V ot/pt - 0 used / Availity [id number] / ds 5/7/25 // CPT 23748 Not covered. CPT 62094 requires authorization.    Current Problem:   Problem List Items Addressed This Visit           ICD-10-CM    Low back pain M54.50       Precautions: Precautions  STEADI Fall Risk Score (The score of 4 or more indicates an increased risk of falling): 10   High fall risk, H/o chronic back pain, diabetes, HTN, neuropathy, OA   Hx of x 2 falls December and March.      Subjective   General Comment: Pt reports that he is feeling that he is getting stronger since starting skilled PT. He reports he is still having trouble with balance and prolonged walking. Admits that he is still would like to see his strength improve in his legs as well. He reports that would like to update his HEP to help maintain gains in skilled PT.       Pre-Treatment Symptoms:   Pain Assessment: 0-10  0-10 (Numeric) Pain Score: 1  Pain Location: Back    Objective   Reduction in strength into the hip flexors, gluteal strength within abduction as well as extension. Reduction into core stability     Reduced safety awareness and mild impulsive but pt is aware of cog deficits.     Treatments:      Therapeutic Exercise [id number]:   Nu-step x 8 min LE only;    Standing 3 way Hip 2x10   Standing hamstring curls 2x10   Standing marching 2x10   Seated Hip adduction with 5 second hold 2x10   Seated Hip Abduction with Green TB 2x10     DNP  Sit to stand no Ue's 3 sets x 10 reps  Leg curl 55# 2x12  Leg press #30 2x10  Knee ext 5# ea (10# total) performed 3 sets x 10 reps  Alternating step ups  R/L x 10 reps ea x 3 sets    NMR [id number]: DNP   Resisted gait lateral L/R and fwd/backwd 7.5# x 3 (CGA)  On foam feet together light perturbations 2x60''   On foam feet together with shoulder flexion 2x10   Foam surface mult reps/sets 2x 30'' each of   NBOS with EO and EC  Head turns   Up/down  Alt step taps on foam   Walking with head turns L/R, up/down  Tandem stance no UE  30 sec 3x    *UE support as needed with CGA/SBA for safety    Deferred:  AP rockerboard  Toe taps 4 inch x 20  Fwd/backward walk, side step 10 feet x 4 each  Walk with start stop 360 180 bkwd and fast and slow    Assessment:   PT Assessment  Assessment Comment: Pt has been seen in skilled PT for 10 visits for low back pain without radicular symptoms. Pt has made positive gains in BLE strength and lumbar flexibility improving standing and walking tolerance allowing for an overall improvement into pts functional status. Despite the positive gains made in skilled PT he/she continues to display weakness into hip flexors gluteal muscles and reduction into core stability reducing prolonged walking, standing and lifting tasks. Pt would benefit from additional skilled PT sessions to address above impairments to promote independence in ADLs, IADLs and recreational tasks.    HEP  Access Code: [email or web address]  URL: [email or web address]  Date: 07/24/2025  Prepared by: Dyan Malave    Exercises  - Seated Piriformis Stretch  - 1 x daily - 3-4 x weekly - 1 sets - 3 reps - 30 second hold  - Seated Sciatic Tensioner  - 1 x daily - 3-4 x weekly - 1 sets - 10 reps - 5 second hold  - Supine Lower Trunk Rotation  - 1 x daily - 3-4 x weekly - 1 sets - 10 reps - 5 second hold  - Standing Hip Flexion with Counter Support  - 1 x daily - 3-4 x weekly - 1-2 sets - 10 reps  - Standing Hip Abduction with Counter Support  - 1 x daily - 3-4 x weekly - 1-2 sets - 10 reps  - Standing Hip Extension with Counter Support  - 1 x daily - 3-4 x weekly - 1-2 sets - 10 reps  -  Standing Knee Flexion with Unilateral Counter Support  - 1 x daily - 3-4 x weekly - 1-2 sets - 10 reps  - Standing March with Counter Support  - 1 x daily - 3-4 x weekly - 1-2 sets - 10 reps  - Seated Hip Adduction Isometrics with Ball  - 1 x daily - 7 x weekly - 2 sets - 10 reps - 5 hold  - Seated Hip Abduction with Resistance  - 1 x daily - 7 x weekly - 2 sets - 10 reps - 5 hold    Post-Treatment Symptoms:   Response to Interventions: Decrease in pain  Plan: Continue 2x a week for an additional 10 visits to promote core stability, gluteal and hip flexor strength        Goals:   Active       PT Problem       PT Goal 1 (Progressing)       Start:  05/08/25    Expected End:  07/31/25       Pts pain is on average 1-2/10 however still having difficulty with lifting and prolonged walking. - 1) Pt will report pain levels no greater than 1/10 at worst with activity for less difficulty standing, walking, lifting, and transferring.     Soreness and pain with lumbar flexion - 2) Pt will display improved pain-free lumbar spine AROM WFL for less difficulty standing, walking, twisting, lifting, transferring.    Goal met at 20% 7/24/25 3) Pt will score <20% impairment on Low Back Disability Questionnaire to indicate minimal low back dysfunction.    Greatest limitation being abductor and extensor strength 4) Pt will improve pain-free bilateral hip and knee strength to at least 4+/5 for all major muscle groups for improved functional strength with transferring, stairs, and general mobility.      Uses SPC for longer distances with reduction in stability with turning quickly. - 5) Pt will displays improved steadiness on feet with least restrictive AD for improved mobility with transfers and walking.     Pts pain is on average 1-2/10 however still having difficulty with lifting and prolonged walking.

## 2025-07-29 ENCOUNTER — TREATMENT (OUTPATIENT)
Facility: CLINIC | Age: 80
End: 2025-07-29
Payer: MEDICARE

## 2025-07-29 DIAGNOSIS — G89.29 CHRONIC LOW BACK PAIN WITHOUT SCIATICA, UNSPECIFIED BACK PAIN LATERALITY: ICD-10-CM

## 2025-07-29 DIAGNOSIS — M54.50 CHRONIC LOW BACK PAIN WITHOUT SCIATICA, UNSPECIFIED BACK PAIN LATERALITY: ICD-10-CM

## 2025-07-29 PROCEDURE — 97112 NEUROMUSCULAR REEDUCATION: CPT | Mod: GP | Performed by: PHYSICAL THERAPIST

## 2025-07-29 PROCEDURE — 97110 THERAPEUTIC EXERCISES: CPT | Mod: GP | Performed by: PHYSICAL THERAPIST

## 2025-07-29 ASSESSMENT — PAIN - FUNCTIONAL ASSESSMENT: PAIN_FUNCTIONAL_ASSESSMENT: 0-10

## 2025-07-29 ASSESSMENT — PAIN SCALES - GENERAL: PAINLEVEL_OUTOF10: 0 - NO PAIN

## 2025-07-29 NOTE — PROGRESS NOTES
Physical Therapy Treatment    Patient Name: Daryl Ferrara  MRN: [id number]  Encounter date: 7/29/2025    Time Calculation  Start Time: 1015  Stop Time: 1057  Time Calculation (min): 42 min  PT Therapeutic Procedures Time Entry  Therapeutic Exercise Time Entry: 24  Neuromuscular Re-Education Time Entry: 17    Visit # 11 of 20  Visits/Dates Authorized: MMO - NO AUTH / $30 copay / $1000 OOP not met / 40V ot/pt - 0 used / Availity [id number] / ds 5/7/25 // CPT 93535 Not covered. CPT 46518 requires authorization.    Current Problem:   Problem List Items Addressed This Visit           ICD-10-CM    Low back pain M54.50       Precautions: Precautions  STEADI Fall Risk Score (The score of 4 or more indicates an increased risk of falling): 10   High fall risk, H/o chronic back pain, diabetes, HTN, neuropathy, OA   Hx of x 2 falls December and March.      Subjective     Patient reports no pain today stated he is still trying to improve balance. Patient reports compliance with HEP and denies soreness after last session. Patient stated he uses a cane sometimes and sometimes no cane at all.       Pre-Treatment Symptoms:   Pain Assessment: 0-10  0-10 (Numeric) Pain Score: 0 - No pain    Objective   Reduction in strength into the hip flexors, gluteal strength within abduction as well as extension. Reduction into core stability     Reduced safety awareness and mild impulsive but pt is aware of cog deficits.     Treatments:      Therapeutic Exercise [id number]:   Nu-step x 8 min LE only/sci fit L3  Standing 3 way Hip with orange band 1x10   Standing hamstring curls 1.5# 2x10   Standing marching 1.5# 2x10   Seated Hip adduction with 5 second hold 2x10   Seated Hip Abduction with Green TB 2x10     DNP  Sit to stand no Ue's 3 sets x 10 reps  Leg curl 55# 2x12  Leg press #30 2x10  Knee ext 5# ea (10# total) performed 3 sets x 10 reps  Alternating step ups R/L x 10 reps ea x 3 sets    NMR 17467:   Resisted gait lateral L/R and fwd/backwd  7.5# x 4 (CGA)  On foam feet together light perturbations 2x60''   Foam surface mult reps/sets 2x 30'' each of   NBOS with EO and EC  Head turns   Up/down    DNP:  On foam feet together with shoulder flexion 2x10   Alt step taps on foam   Walking with head turns L/R, up/down  Tandem stance no UE  30 sec 3x    *UE support as needed with CGA/SBA for safety    Deferred:  AP rockerboard  Toe taps 4 inch x 20  Fwd/backward walk, side step 10 feet x 4 each  Walk with start stop 360 180 bkwd and fast and slow    Assessment:    Patient worked hard in clinic and tolerated all progressions/additions well without pain reported. Cues provided for avoidance of substitutions and does require SBA/CGA for most balance activities for safety. UE support intermittently where appropriate.     HEP  Access Code: [email or web address]  URL: [email or web address]  Date: 07/24/2025  Prepared by: Dyan Malave    Exercises  - Seated Piriformis Stretch  - 1 x daily - 3-4 x weekly - 1 sets - 3 reps - 30 second hold  - Seated Sciatic Tensioner  - 1 x daily - 3-4 x weekly - 1 sets - 10 reps - 5 second hold  - Supine Lower Trunk Rotation  - 1 x daily - 3-4 x weekly - 1 sets - 10 reps - 5 second hold  - Standing Hip Flexion with Counter Support  - 1 x daily - 3-4 x weekly - 1-2 sets - 10 reps  - Standing Hip Abduction with Counter Support  - 1 x daily - 3-4 x weekly - 1-2 sets - 10 reps  - Standing Hip Extension with Counter Support  - 1 x daily - 3-4 x weekly - 1-2 sets - 10 reps  - Standing Knee Flexion with Unilateral Counter Support  - 1 x daily - 3-4 x weekly - 1-2 sets - 10 reps  - Standing March with Counter Support  - 1 x daily - 3-4 x weekly - 1-2 sets - 10 reps  - Seated Hip Adduction Isometrics with Ball  - 1 x daily - 7 x weekly - 2 sets - 10 reps - 5 hold  - Seated Hip Abduction with Resistance  - 1 x daily - 7 x weekly - 2 sets - 10 reps - 5 hold    Post-Treatment Symptoms:   Response to Interventions: No change in pain  Plan: continue to  progress balance and strength exercises to tolerance       Goals:   Active       PT Problem       PT Goal 1 (Progressing)       Start:  05/08/25    Expected End:  07/31/25       Pts pain is on average 1-2/10 however still having difficulty with lifting and prolonged walking. - 1) Pt will report pain levels no greater than 1/10 at worst with activity for less difficulty standing, walking, lifting, and transferring.     Soreness and pain with lumbar flexion - 2) Pt will display improved pain-free lumbar spine AROM WFL for less difficulty standing, walking, twisting, lifting, transferring.    Goal met at 20% 7/24/25 3) Pt will score <20% impairment on Low Back Disability Questionnaire to indicate minimal low back dysfunction.    Greatest limitation being abductor and extensor strength 4) Pt will improve pain-free bilateral hip and knee strength to at least 4+/5 for all major muscle groups for improved functional strength with transferring, stairs, and general mobility.      Uses SPC for longer distances with reduction in stability with turning quickly. - 5) Pt will displays improved steadiness on feet with least restrictive AD for improved mobility with transfers and walking.     Pts pain is on average 1-2/10 however still having difficulty with lifting and prolonged walking.

## 2025-08-08 ENCOUNTER — TREATMENT (OUTPATIENT)
Facility: CLINIC | Age: 80
End: 2025-08-08
Payer: MEDICARE

## 2025-08-08 DIAGNOSIS — M54.50 CHRONIC LOW BACK PAIN WITHOUT SCIATICA, UNSPECIFIED BACK PAIN LATERALITY: ICD-10-CM

## 2025-08-08 DIAGNOSIS — G89.29 CHRONIC LOW BACK PAIN WITHOUT SCIATICA, UNSPECIFIED BACK PAIN LATERALITY: ICD-10-CM

## 2025-08-08 PROCEDURE — 97112 NEUROMUSCULAR REEDUCATION: CPT | Mod: GP,CQ

## 2025-08-08 PROCEDURE — 97110 THERAPEUTIC EXERCISES: CPT | Mod: GP,CQ

## 2025-08-08 NOTE — PROGRESS NOTES
Physical Therapy Treatment    Patient Name: Daryl Ferrara  MRN: [id number]  YOB: 1945  Encounter Date: 8/8/2025    Time Entry:  Time Calculation  Start Time: 1121  Stop Time: 1200  Time Calculation (min): 39 min     PT Therapeutic Procedures Time Entry  Therapeutic Exercise Time Entry: 20  Neuromuscular Re-Education Time Entry: 19                   Rehab Insurance Information:   Visit Count: 12  POC Visits: 20  Auth Required: No        Additional Authorization/Insurance Information: 2025: MMO - NO AUTH / $30 copay / $1000 OOP not met / 40V ot/pt - 0 used / Availity [id number] / ds 5/7/25 // CPT 22193 Not covered. CPT 81179 requires authorization.     Rehab Falls Risk Assessment:  Fall Risk Indicated: Yes  Factors for Low Risk for Falls: Age 65 or older, Multiple current diagnoses      Problem List Items Addressed This Visit           ICD-10-CM    Low back pain M54.50        Subjective   Cont to do well.  Pain reported as 0.           Objective using cane into department just for long walk from parking lot otherwise not using              Activities   Therapeutic Exercise  Therapeutic Exercise Performed: Yes  Therapeutic Exercise Activity 1: Nu-step x 8 min LE only/sci fit L3  Therapeutic Exercise Activity 2: Standing 3 way Hip with orange band 1x10  Therapeutic Exercise Activity 3: Standing hamstring curls 1.5# 2x10  Therapeutic Exercise Activity 4: Standing marching 1.5# 2x10  Therapeutic Exercise Activity 5: Sit to stand no Ue's 3 sets x 10 reps  Therapeutic Exercise Activity 6: Leg curl 55# 2x12  Therapeutic Exercise Activity 7: Leg press #30 2x10  Therapeutic Exercise Activity 8: Knee ext 5# ea (10# total) performed 3 sets x 10 reps  Therapeutic Exercise Activity 9: Alternating step ups R/L x 10 reps ea x 3 sets           Balance/Neuromuscular Re-Education  Balance/Neuromuscular Re-Education Activity Performed: Yes  Balance/Neuromuscular Re-Education Activity 1:  Resisted gait lateral L/R and fwd/backwd 7.5# x 4 (CGA)  Balance/Neuromuscular Re-Education Activity 2: On foam feet together light perturbations 2x60''  Balance/Neuromuscular Re-Education Activity 3: Alt step taps on foam  Balance/Neuromuscular Re-Education Activity 4: foam nods nos ec eo  alt arm raise     Assessment/Plan   Assessment: Cotn to do well will cont with HEP on own, joined [facility] to use equipment.   goals: no pain, doing well getting in and out of bed, Improved strength for doing stairs without issues, able to do yard work without cane now,  only using cane now for longer distances Patient tolerated treatment well      Plan:    Per patient discharge today  Active       PT Problem       PT Goal 1 (Progressing)       Start:  05/08/25    Expected End:  07/31/25       Pts pain is on average 1-2/10 however still having difficulty with lifting and prolonged walking. - 1) Pt will report pain levels no greater than 1/10 at worst with activity for less difficulty standing, walking, lifting, and transferring.     Soreness and pain with lumbar flexion - 2) Pt will display improved pain-free lumbar spine AROM WFL for less difficulty standing, walking, twisting, lifting, transferring.    Goal met at 20% 7/24/25 3) Pt will score <20% impairment on Low Back Disability Questionnaire to indicate minimal low back dysfunction.    Greatest limitation being abductor and extensor strength 4) Pt will improve pain-free bilateral hip and knee strength to at least 4+/5 for all major muscle groups for improved functional strength with transferring, stairs, and general mobility.      Uses SPC for longer distances with reduction in stability with turning quickly. - 5) Pt will displays improved steadiness on feet with least restrictive AD for improved mobility with transfers and walking.     Pts pain is on average 1-2/10 however still having difficulty with lifting and prolonged walking.

## 2025-08-11 ENCOUNTER — PROCEDURE VISIT (OUTPATIENT)
Dept: SLEEP MEDICINE | Facility: HOSPITAL | Age: 80
End: 2025-08-11
Payer: MEDICARE

## 2025-08-11 DIAGNOSIS — G47.33 OBSTRUCTIVE SLEEP APNEA, ADULT: ICD-10-CM

## 2025-08-11 DIAGNOSIS — Z45.42 ENCOUNTER FOR ADJUSTMENT AND MANAGEMENT OF NEUROSTIMULATOR: ICD-10-CM

## 2025-08-31 DIAGNOSIS — M54.50 LOW BACK PAIN, UNSPECIFIED BACK PAIN LATERALITY, UNSPECIFIED CHRONICITY, UNSPECIFIED WHETHER SCIATICA PRESENT: ICD-10-CM

## 2025-08-31 DIAGNOSIS — E78.5 HYPERLIPIDEMIA, UNSPECIFIED HYPERLIPIDEMIA TYPE: ICD-10-CM

## 2025-08-31 DIAGNOSIS — I10 BENIGN ESSENTIAL HYPERTENSION: ICD-10-CM

## 2025-08-31 DIAGNOSIS — E11.69 TYPE 2 DIABETES MELLITUS WITH HYPERLIPIDEMIA (MULTI): ICD-10-CM

## 2025-08-31 DIAGNOSIS — E78.5 TYPE 2 DIABETES MELLITUS WITH HYPERLIPIDEMIA (MULTI): ICD-10-CM

## 2025-09-02 RX ORDER — LOSARTAN POTASSIUM 50 MG/1
50 TABLET ORAL DAILY
Qty: 90 TABLET | Refills: 3 | Status: SHIPPED | OUTPATIENT
Start: 2025-09-02

## 2025-09-02 RX ORDER — METFORMIN HYDROCHLORIDE 1000 MG/1
1000 TABLET ORAL
Qty: 180 TABLET | Refills: 3 | Status: SHIPPED | OUTPATIENT
Start: 2025-09-02

## 2025-09-02 RX ORDER — MELOXICAM 15 MG/1
TABLET ORAL
Qty: 45 TABLET | Refills: 1 | Status: SHIPPED | OUTPATIENT
Start: 2025-09-02

## 2025-09-02 RX ORDER — PRAVASTATIN SODIUM 20 MG/1
20 TABLET ORAL 3 TIMES WEEKLY
Qty: 40 TABLET | Refills: 3 | Status: SHIPPED | OUTPATIENT
Start: 2025-09-03

## 2025-09-04 ENCOUNTER — APPOINTMENT (OUTPATIENT)
Dept: SLEEP MEDICINE | Facility: CLINIC | Age: 80
End: 2025-09-04
Payer: MEDICARE

## 2025-09-04 ASSESSMENT — SLEEP AND FATIGUE QUESTIONNAIRES
SLEEP_PROBLEM_INTERFERES_DAILY_ACTIVITIES: NOT AT ALL NOTICEABLE
HOW LIKELY ARE YOU TO NOD OFF OR FALL ASLEEP WHILE SITTING AND READING: SLIGHT CHANCE OF DOZING
HOW LIKELY ARE YOU TO NOD OFF OR FALL ASLEEP WHILE SITTING QUIETLY AFTER LUNCH WITHOUT ALCOHOL: WOULD NEVER DOZE
HOW LIKELY ARE YOU TO NOD OFF OR FALL ASLEEP WHILE WATCHING TV: SLIGHT CHANCE OF DOZING
WAKING_TOO_EARLY: MILD
SITING INACTIVE IN A PUBLIC PLACE LIKE A CLASS ROOM OR A MOVIE THEATER: WOULD NEVER DOZE
DIFFICULTY_FALLING_ASLEEP: MILD
HOW LIKELY ARE YOU TO NOD OFF OR FALL ASLEEP WHILE LYING DOWN TO REST IN THE AFTERNOON WHEN CIRCUMSTANCES PERMIT: MODERATE CHANCE OF DOZING
SATISFACTION_WITH_CURRENT_SLEEP_PATTERN: SATISFIED
HOW LIKELY ARE YOU TO NOD OFF OR FALL ASLEEP WHEN YOU ARE A PASSENGER IN A CAR FOR AN HOUR WITHOUT A BREAK: WOULD NEVER DOZE
HOW LIKELY ARE YOU TO NOD OFF OR FALL ASLEEP WHILE SITTING AND TALKING TO SOMEONE: WOULD NEVER DOZE
DIFFICULTY_STAYING_ASLEEP: MILD
SLEEP_PROBLEM_NOTICEABLE_TO_OTHERS: NOT AT ALL NOTICEABLE
HOW LIKELY ARE YOU TO NOD OFF OR FALL ASLEEP IN A CAR, WHILE STOPPED FOR A FEW MINUTES IN TRAFFIC: WOULD NEVER DOZE
ESS-CHAD TOTAL SCORE: 4
WORRIED_DISTRESSED_DUE_TO_SLEEP: A LITTLE

## 2025-09-04 ASSESSMENT — LIFESTYLE VARIABLES
HOW MANY STANDARD DRINKS CONTAINING ALCOHOL DO YOU HAVE ON A TYPICAL DAY: PATIENT DOES NOT DRINK
HOW OFTEN DO YOU HAVE A DRINK CONTAINING ALCOHOL: NEVER
SKIP TO QUESTIONS 9-10: 1
HOW OFTEN DO YOU HAVE SIX OR MORE DRINKS ON ONE OCCASION: NEVER
AUDIT-C TOTAL SCORE: 0

## 2025-09-04 ASSESSMENT — PAIN SCALES - GENERAL: PAINLEVEL_OUTOF10: 0-NO PAIN

## 2025-09-07 DIAGNOSIS — K21.9 GASTROESOPHAGEAL REFLUX DISEASE, UNSPECIFIED WHETHER ESOPHAGITIS PRESENT: ICD-10-CM

## 2025-09-07 RX ORDER — PANTOPRAZOLE SODIUM 40 MG/1
TABLET, DELAYED RELEASE ORAL
Qty: 90 TABLET | Refills: 3 | Status: SHIPPED | OUTPATIENT
Start: 2025-09-07

## 2025-10-02 ENCOUNTER — APPOINTMENT (OUTPATIENT)
Dept: PRIMARY CARE | Facility: CLINIC | Age: 80
End: 2025-10-02
Payer: MEDICARE

## 2025-12-04 ENCOUNTER — APPOINTMENT (OUTPATIENT)
Dept: SLEEP MEDICINE | Facility: CLINIC | Age: 80
End: 2025-12-04
Payer: MEDICARE